# Patient Record
Sex: MALE | Race: WHITE | Employment: OTHER | URBAN - METROPOLITAN AREA
[De-identification: names, ages, dates, MRNs, and addresses within clinical notes are randomized per-mention and may not be internally consistent; named-entity substitution may affect disease eponyms.]

---

## 2017-09-25 NOTE — TELEPHONE ENCOUNTER
Requested Prescriptions     Pending Prescriptions Disp Refills    topiramate (TOPAMAX) 50 mg tablet 30 Tab 3     Sig: Take 1 Tab by mouth nightly.  traZODone (DESYREL) 100 mg tablet 30 Tab 3     Sig: Take 1 Tab by mouth nightly.  hydrOXYzine HCl (ATARAX) 50 mg tablet 90 Tab 3     Sig: Take 1 Tab by mouth three (3) times daily as needed for Itching for up to 10 days.        Last Refill: 12/14/2016  Next Appointment: 01/04/2018

## 2017-09-26 RX ORDER — TRAZODONE HYDROCHLORIDE 100 MG/1
100 TABLET ORAL
Qty: 30 TAB | Refills: 3 | Status: SHIPPED | OUTPATIENT
Start: 2017-09-26 | End: 2021-06-04

## 2017-09-26 RX ORDER — HYDROXYZINE 50 MG/1
50 TABLET, FILM COATED ORAL
Qty: 90 TAB | Refills: 3 | Status: SHIPPED | OUTPATIENT
Start: 2017-09-26 | End: 2017-10-06

## 2017-09-26 RX ORDER — TOPIRAMATE 50 MG/1
50 TABLET, FILM COATED ORAL
Qty: 30 TAB | Refills: 3 | Status: SHIPPED | OUTPATIENT
Start: 2017-09-26 | End: 2021-06-04

## 2017-09-27 ENCOUNTER — HOSPITAL ENCOUNTER (OUTPATIENT)
Dept: PREADMISSION TESTING | Age: 47
Discharge: HOME | End: 2017-09-27
Attending: ORTHOPAEDIC SURGERY
Payer: COMMERCIAL

## 2017-09-27 ENCOUNTER — HOSPITAL ENCOUNTER (OUTPATIENT)
Dept: GENERAL RADIOLOGY | Age: 47
Discharge: HOME | End: 2017-09-27
Attending: ORTHOPAEDIC SURGERY
Payer: COMMERCIAL

## 2017-09-27 VITALS
HEIGHT: 74 IN | OXYGEN SATURATION: 99 % | DIASTOLIC BLOOD PRESSURE: 70 MMHG | SYSTOLIC BLOOD PRESSURE: 122 MMHG | WEIGHT: 228.18 LBS | BODY MASS INDEX: 29.28 KG/M2 | HEART RATE: 67 BPM | RESPIRATION RATE: 18 BRPM | TEMPERATURE: 97.8 F

## 2017-09-27 LAB
25(OH)D3 SERPL-MCNC: 31.6 NG/ML (ref 30–100)
ABO + RH BLD: NORMAL
ALBUMIN SERPL-MCNC: 4.1 G/DL (ref 3.5–5)
ALBUMIN/GLOB SERPL: 1.3 {RATIO} (ref 1.1–2.2)
ALP SERPL-CCNC: 46 U/L (ref 45–117)
ALT SERPL-CCNC: 38 U/L (ref 12–78)
ANION GAP SERPL CALC-SCNC: 7 MMOL/L (ref 5–15)
APPEARANCE UR: CLEAR
AST SERPL-CCNC: 18 U/L (ref 15–37)
ATRIAL RATE: 59 BPM
BACTERIA URNS QL MICRO: NEGATIVE /HPF
BILIRUB SERPL-MCNC: 0.4 MG/DL (ref 0.2–1)
BILIRUB UR QL: NEGATIVE
BLOOD GROUP ANTIBODIES SERPL: NORMAL
BUN SERPL-MCNC: 23 MG/DL (ref 6–20)
BUN/CREAT SERPL: 19 (ref 12–20)
CALCIUM SERPL-MCNC: 8.5 MG/DL (ref 8.5–10.1)
CALCULATED P AXIS, ECG09: 51 DEGREES
CALCULATED R AXIS, ECG10: 34 DEGREES
CALCULATED T AXIS, ECG11: 16 DEGREES
CHLORIDE SERPL-SCNC: 111 MMOL/L (ref 97–108)
CO2 SERPL-SCNC: 25 MMOL/L (ref 21–32)
COLOR UR: ABNORMAL
CREAT SERPL-MCNC: 1.18 MG/DL (ref 0.7–1.3)
DIAGNOSIS, 93000: NORMAL
EPITH CASTS URNS QL MICRO: ABNORMAL /LPF
ERYTHROCYTE [DISTWIDTH] IN BLOOD BY AUTOMATED COUNT: 14.4 % (ref 11.5–14.5)
EST. AVERAGE GLUCOSE BLD GHB EST-MCNC: 126 MG/DL
GLOBULIN SER CALC-MCNC: 3.2 G/DL (ref 2–4)
GLUCOSE SERPL-MCNC: 105 MG/DL (ref 65–100)
GLUCOSE UR STRIP.AUTO-MCNC: NEGATIVE MG/DL
HBA1C MFR BLD: 6 % (ref 4.2–6.3)
HCT VFR BLD AUTO: 38.4 % (ref 36.6–50.3)
HGB BLD-MCNC: 12.8 G/DL (ref 12.1–17)
HGB UR QL STRIP: NEGATIVE
HYALINE CASTS URNS QL MICRO: ABNORMAL /LPF (ref 0–5)
INR PPP: 1.1 (ref 0.9–1.1)
KETONES UR QL STRIP.AUTO: NEGATIVE MG/DL
LEUKOCYTE ESTERASE UR QL STRIP.AUTO: NEGATIVE
MCH RBC QN AUTO: 26.6 PG (ref 26–34)
MCHC RBC AUTO-ENTMCNC: 33.3 G/DL (ref 30–36.5)
MCV RBC AUTO: 79.8 FL (ref 80–99)
MUCOUS THREADS URNS QL MICRO: ABNORMAL /LPF
NITRITE UR QL STRIP.AUTO: NEGATIVE
P-R INTERVAL, ECG05: 140 MS
PH UR STRIP: 5.5 [PH] (ref 5–8)
PLATELET # BLD AUTO: 249 K/UL (ref 150–400)
POTASSIUM SERPL-SCNC: 4.2 MMOL/L (ref 3.5–5.1)
PREALB SERPL-MCNC: 28.6 MG/DL (ref 20–40)
PROT SERPL-MCNC: 7.3 G/DL (ref 6.4–8.2)
PROT UR STRIP-MCNC: NEGATIVE MG/DL
PROTHROMBIN TIME: 10.6 SEC (ref 9–11.1)
Q-T INTERVAL, ECG07: 416 MS
QRS DURATION, ECG06: 90 MS
QTC CALCULATION (BEZET), ECG08: 411 MS
RBC # BLD AUTO: 4.81 M/UL (ref 4.1–5.7)
RBC #/AREA URNS HPF: ABNORMAL /HPF (ref 0–5)
SODIUM SERPL-SCNC: 143 MMOL/L (ref 136–145)
SP GR UR REFRACTOMETRY: 1.03 (ref 1–1.03)
SPECIMEN EXP DATE BLD: NORMAL
UA: UC IF INDICATED,UAUC: ABNORMAL
UROBILINOGEN UR QL STRIP.AUTO: 0.2 EU/DL (ref 0.2–1)
VENTRICULAR RATE, ECG03: 59 BPM
WBC # BLD AUTO: 5.8 K/UL (ref 4.1–11.1)
WBC URNS QL MICRO: ABNORMAL /HPF (ref 0–4)

## 2017-09-27 PROCEDURE — 84134 ASSAY OF PREALBUMIN: CPT | Performed by: ORTHOPAEDIC SURGERY

## 2017-09-27 PROCEDURE — 36415 COLL VENOUS BLD VENIPUNCTURE: CPT | Performed by: ORTHOPAEDIC SURGERY

## 2017-09-27 PROCEDURE — 85027 COMPLETE CBC AUTOMATED: CPT | Performed by: ORTHOPAEDIC SURGERY

## 2017-09-27 PROCEDURE — 93005 ELECTROCARDIOGRAM TRACING: CPT

## 2017-09-27 PROCEDURE — 71020 XR CHEST PA LAT: CPT

## 2017-09-27 PROCEDURE — 83036 HEMOGLOBIN GLYCOSYLATED A1C: CPT | Performed by: ORTHOPAEDIC SURGERY

## 2017-09-27 PROCEDURE — 85610 PROTHROMBIN TIME: CPT | Performed by: ORTHOPAEDIC SURGERY

## 2017-09-27 PROCEDURE — 86900 BLOOD TYPING SEROLOGIC ABO: CPT | Performed by: ORTHOPAEDIC SURGERY

## 2017-09-27 PROCEDURE — 81001 URINALYSIS AUTO W/SCOPE: CPT | Performed by: ORTHOPAEDIC SURGERY

## 2017-09-27 PROCEDURE — 80053 COMPREHEN METABOLIC PANEL: CPT | Performed by: ORTHOPAEDIC SURGERY

## 2017-09-27 PROCEDURE — 82306 VITAMIN D 25 HYDROXY: CPT | Performed by: ORTHOPAEDIC SURGERY

## 2017-09-27 RX ORDER — SODIUM CHLORIDE, SODIUM LACTATE, POTASSIUM CHLORIDE, CALCIUM CHLORIDE 600; 310; 30; 20 MG/100ML; MG/100ML; MG/100ML; MG/100ML
25 INJECTION, SOLUTION INTRAVENOUS CONTINUOUS
Status: CANCELLED | OUTPATIENT
Start: 2017-10-10

## 2017-09-27 RX ORDER — PREGABALIN 150 MG/1
150 CAPSULE ORAL ONCE
Status: CANCELLED | OUTPATIENT
Start: 2017-10-10 | End: 2017-10-10

## 2017-09-27 RX ORDER — ACETAMINOPHEN 500 MG
1000 TABLET ORAL ONCE
Status: CANCELLED | OUTPATIENT
Start: 2017-10-10 | End: 2017-10-10

## 2017-09-27 NOTE — PERIOP NOTES
[facility]  Preoperative Instructions        Surgery Date 10/10/2017          Time of Arrival 7:30 a.m.    1. On the day of your surgery, please report to the Surgical Services Registration Desk and sign in at your designated time. The Surgery Center is located to the right of the Emergency Room. 2. You must have someone with you to drive you home. You should not drive a car for 24 hours following surgery. Please make arrangements for a friend or family member to stay with you for the first 24 hours after your surgery. 3. Do not have anything to eat or drink (including water, gum, mints, coffee, juice) after midnight. ?This may not apply to medications prescribed by your physician. ?(Please note below the special instructions with medications to take the morning of your procedure.)    4. We recommend you do not drink any alcoholic beverages for 24 hours before and after your surgery. 5. Contact your surgeons office for instructions on the following medications: non-steroidal anti-inflammatory drugs (i.e. Advil, Aleve), vitamins, and supplements. (Some surgeons will want you to stop these medications prior to surgery and others may allow you to take them)  **If you are currently taking Plavix, Coumadin, Aspirin and/or other blood-thinning agents, contact your surgeon for instructions. ** Your surgeon will partner with the physician prescribing these medications to determine if it is safe to stop or if you need to continue taking. Please do not stop taking these medications without instructions from your surgeon    6. Wear comfortable clothes. Wear glasses instead of contacts. Do not bring any money or jewelry. Please bring picture ID, insurance card, and any prearranged co-payment or hospital payment. Do not wear make-up, particularly mascara the morning of your surgery. Do not wear nail polish, particularly if you are having foot /hand surgery.   Wear your hair loose or down, no ponytails, buns, radha pins or clips. All body piercings must be removed. Please shower with antibacterial soap for three consecutive days before and on the morning of surgery, but do not apply any lotions, powders or deodorants after the shower on the day of surgery. Please use a fresh towels after each shower. Please sleep in clean clothes and change bed linens the night before surgery. Please do not shave for 48 hours prior to surgery. Shaving of the face is acceptable. 7. You should understand that if you do not follow these instructions your surgery may be cancelled. If your physical condition changes (I.e. fever, cold or flu) please contact your surgeon as soon as possible. 8. It is important that you be on time. If a situation occurs where you may be late, please call [phone] (OR Holding Area). 9. If you have any questions and or problems, please call [phone] (Pre-admission Testing). 10. Your surgery time may be subject to change. You will receive a phone call the evening prior if your time changes. 11.  If having outpatient surgery, you must have someone to drive you here, stay with you during the duration of your stay, and to drive you home at time of discharge. 12.   In an effort to improve the efficiency, privacy, and safety for all of our Pre-op patients visitors are not allowed in the Holding area. Once you arrive and are registered your family/visitors will be asked to remain in the waiting room. The Pre-op staff will get you from the Surgical Waiting Area and will explain to you and your family/visitors that the Pre-op phase is beginning. The staff will answer any questions and provide instructions for tracking of the patient, by use of the existing tracking number and color-coded status board in the waiting room.   At this time the staff will also ask for your designated spokesperson information in the event that the physician or staff need to provide an update or obtain any pertinent information. The designated spokesperson will be notified if the physician needs to speak to family during the pre-operative phase. If at any time your family/visitors has questions or concerns they may approach the volunteer desk in the waiting area for assistance. Special Instructions:    MEDICATIONS TO TAKE THE MORNING OF SURGERY WITH A SIP OF WATER: hydroxyzine if needed      I understand a pre-operative phone call will be made to verify my surgery time. In the event that I am not available, I give permission for a message to be left on my answering service and/or with another person?   Yes [phone]           ___________________      __________   _________    (Signature of Patient)             (Witness)                (Date and Time)

## 2017-09-28 LAB
BACTERIA SPEC CULT: NORMAL
BACTERIA SPEC CULT: NORMAL
SERVICE CMNT-IMP: NORMAL

## 2017-10-02 ENCOUNTER — OFFICE VISIT (OUTPATIENT)
Dept: INTERNAL MEDICINE CLINIC | Age: 47
End: 2017-10-02

## 2017-10-02 VITALS
HEIGHT: 74 IN | BODY MASS INDEX: 29 KG/M2 | DIASTOLIC BLOOD PRESSURE: 85 MMHG | WEIGHT: 226 LBS | SYSTOLIC BLOOD PRESSURE: 131 MMHG | HEART RATE: 67 BPM

## 2017-10-02 DIAGNOSIS — Z01.818 PREOPERATIVE EXAMINATION: Primary | ICD-10-CM

## 2017-10-02 PROBLEM — Z00.00 ANNUAL PHYSICAL EXAM: Status: ACTIVE | Noted: 2017-10-02

## 2017-10-02 PROBLEM — F10.10 ALCOHOL ABUSE: Status: ACTIVE | Noted: 2017-10-02

## 2017-10-02 PROBLEM — C44.91 BASAL CELL CARCINOMA: Status: ACTIVE | Noted: 2017-10-02

## 2017-10-02 PROBLEM — M54.12 RIGHT CERVICAL RADICULOPATHY: Status: ACTIVE | Noted: 2017-10-02

## 2017-10-02 PROBLEM — S83.209A TORN MENISCUS: Status: ACTIVE | Noted: 2017-10-02

## 2017-10-02 PROBLEM — G47.9 SLEEP DISORDER: Status: ACTIVE | Noted: 2017-10-02

## 2017-10-02 PROBLEM — M54.9 BACK PAIN: Status: ACTIVE | Noted: 2017-10-02

## 2017-10-02 PROBLEM — M25.511 RIGHT ANTERIOR SHOULDER PAIN: Status: ACTIVE | Noted: 2017-10-02

## 2017-10-02 PROBLEM — I86.1 VARICOCELE: Status: ACTIVE | Noted: 2017-10-02

## 2017-10-02 PROBLEM — F41.1 GAD (GENERALIZED ANXIETY DISORDER): Status: ACTIVE | Noted: 2017-10-02

## 2017-10-02 PROBLEM — F32.A DEPRESSION, ACUTE: Status: ACTIVE | Noted: 2017-10-02

## 2017-10-02 RX ORDER — MELOXICAM 15 MG/1
15 TABLET ORAL DAILY
COMMUNITY
End: 2017-10-02

## 2017-10-02 RX ORDER — HYDROCODONE BITARTRATE AND ACETAMINOPHEN 5; 300 MG/1; MG/1
TABLET ORAL
COMMUNITY
End: 2017-10-02

## 2017-10-02 NOTE — PROGRESS NOTES
Samreen Vidal II is a 56 y.o. male presenting for Pre-op Exam ( 2 -  Dr Sumeet Wayne - 10-10-1-17 - neck)  . 1. Have you been to the ER, urgent care clinic since your last visit? Hospitalized since your last visit? No    2. Have you seen or consulted any other health care providers outside of the [address] [place] [address] since your last visit? Include any pap smears or colon screening.  No

## 2017-10-02 NOTE — MR AVS SNAPSHOT
Visit Information Date & Time Provider Department Dept. Phone Encounter #  
 10/2/2017  3:30 PM MD Mariaa Beth 84 [phone] [id number] Follow-up Instructions Return if symptoms worsen or fail to improve. Routing History Follow-up and Disposition History Your Appointments 1/4/2018  1:00 PM  
Follow Up with MD Mariaa Elena 84 ([facility]) Appt Note: 6 mo  
 [address] [address] [address] [facility] [address] Upcoming Health Maintenance Date Due Pneumococcal 19-64 Medium Risk (1 of 1 - PPSV23) 6/16/1990 DTaP/Tdap/Td series (1 - Tdap) 6/16/1992 INFLUENZA AGE 9 TO ADULT 8/1/2017 Allergies as of 10/2/2017  Review Complete On: 10/2/2017 By: Td Reich MD  
 No Known Allergies Current Immunizations  Never Reviewed No immunizations on file. Not reviewed this visit You Were Diagnosed With   
  
 Codes Comments Preoperative examination    -  Primary ICD-10-CM: E79.222 ICD-9-CM: V72.84 Vitals BP Pulse Height(growth percentile) Weight(growth percentile) BMI Smoking Status 131/85 (BP 1 Location: Left arm, BP Patient Position: Sitting) 67 6' 2\" (1.88 m) 226 lb (102.5 kg) 29.02 kg/m2 Never Smoker Vitals History BMI and BSA Data Body Mass Index Body Surface Area  
 29.02 kg/m 2 2.31 m 2 Preferred Pharmacy Pharmacy Name Phone [facility] [address] [phone] Your Updated Medication List  
  
   
This list is accurate as of: 10/2/17  4:47 PM.  Always use your most recent med list.  
  
  
  
  
 hydrOXYzine HCl 50 mg tablet Commonly known as:  ATARAX Take 1 Tab by mouth three (3) times daily as needed for Itching for up to 10 days. topiramate 50 mg tablet Commonly known as:  TOPAMAX Take 1 Tab by mouth nightly. traZODone 100 mg tablet Commonly known as:  Shyam Preston Take 1 Tab by mouth nightly. Follow-up Instructions Return if symptoms worsen or fail to improve. Introducing [facility] SERVICES! Jez Raf[employer] introduces [employer] patient portal. Now you can access parts of your medical record, email your doctor's office, and request medication refills online. 1. In your internet browser, go to [email or web address] 2. Click on the First Time User? Click Here link in the Sign In box. You will see the New Member Sign Up page. 3. Enter your [employer] Access Code exactly as it appears below. You will not need to use this code after youve completed the sign-up process. If you do not sign up before the expiration date, you must request a new code. · [employer] Access Code: [email or web address] Expires: 12/31/2017  3:26 PM 
 
4. Enter the last four digits of your Social Security Number (xxxx) and Date of Birth (mm/dd/yyyy) as indicated and click Submit. You will be taken to the next sign-up page. 5. Create a [employer] ID. This will be your [employer] login ID and cannot be changed, so think of one that is secure and easy to remember. 6. Create a [employer] password. You can change your password at any time. 7. Enter your Password Reset Question and Answer. This can be used at a later time if you forget your password. 8. Enter your e-mail address. You will receive e-mail notification when new information is available in [address] 9. Click Sign Up. You can now view and download portions of your medical record. 10. Click the Download Summary menu link to download a portable copy of your medical information. If you have questions, please visit the Frequently Asked Questions section of the [employer] website. Remember, [employer] is NOT to be used for urgent needs. For medical emergencies, dial 911. Now available from your iPhone and Android! Please provide this summary of care documentation to your next provider. Your primary care clinician is listed as JUS Yu. If you have any questions after today's visit, please call [phone].

## 2017-10-02 NOTE — PROGRESS NOTES
Dyan Zuniga is a 56 y.o. male is a 56 y.o. yo male who presents for preoperative evaluation. He is having a C5-6 revision, C6-7 ACDF on October 10, 2017 with Dr. August Oleary. Latex Allergy:NO    History of anesthesia reaction: No    History of PE/DVT:No    No Known Allergies    Current Outpatient Prescriptions   Medication Sig    topiramate (TOPAMAX) 50 mg tablet Take 1 Tab by mouth nightly.  traZODone (DESYREL) 100 mg tablet Take 1 Tab by mouth nightly.  hydrOXYzine HCl (ATARAX) 50 mg tablet Take 1 Tab by mouth three (3) times daily as needed for Itching for up to 10 days. (Patient taking differently: Take 50 mg by mouth as needed for Itching.)     No current facility-administered medications for this visit.         Patient Active Problem List   Diagnosis Code    Right groin pain R10.31    Alcohol dependence ([address] F10.20    Torn meniscus S83.209A    Right anterior shoulder pain M25.511    Basal cell carcinoma C44.91    Depression, acute F32.9    Alcohol abuse F10.10    IASBEL (generalized anxiety disorder) F41.1    Sleep disorder G47.9    Annual physical exam Z00.00    Varicocele I86.1    Back pain M54.9    Right cervical radiculopathy M54.12       Past Medical History:   Diagnosis Date    Alcohol abuse 10/2/2017    Annual physical exam 10/2/2017    Anxiety     Back pain 10/2/2017    Basal cell carcinoma 10/2/2017    Depression, acute 10/2/2017    ISABEL (generalized anxiety disorder) 10/2/2017    Mood disorder ([facility] [address]     on topiramate, hx of alcoholism    Right anterior shoulder pain 10/2/2017    Right cervical radiculopathy 10/2/2017    Sleep disorder 10/2/2017    Torn meniscus 10/2/2017    Varicocele 10/2/2017        Past Surgical History:   Procedure Laterality Date    HX HERNIA REPAIR  2006   Neon Living    Dr. Opal Pérez [facility] ORTHOPAEDIC  2004    Disc Fusion C5-C6 (Dr. Wang Durand, [facility])       Family History   Problem Relation Age of Onset    Heart Disease Father     Hypertension Father     Diabetes Maternal Uncle     Cancer Maternal Uncle     Heart Disease Maternal Grandmother     Hypertension Maternal Grandmother     Heart Disease Maternal Grandfather     Hypertension Maternal Grandfather     Heart Disease Paternal Grandmother     Hypertension Paternal Grandmother     Cancer Paternal Grandmother      lung    Heart Disease Paternal Grandfather     Hypertension Paternal Grandfather        Social History     Social History    Marital status: SINGLE     Spouse name: N/A    Number of children: N/A    Years of education: N/A     Occupational History    Not on file. Social History Main Topics    Smoking status: Never Smoker    Smokeless tobacco: Current User    Alcohol use Yes      Comment: rarely per pt (hx of ETOH abuse)    Drug use: No    Sexual activity: Not on file     Other Topics Concern    Not on file     Social History Narrative       Reviewed PmHx, RxHx, FmHx, SocHx, AllgHx and updated and dated in the chart. Review of Systems  Constitutional: negative for fevers, chills, anorexia and weight loss  Eyes:   negative for visual disturbance and irritation  ENT:   negative for tinnitus,sore throat,nasal congestion,ear pains. hoarseness  Respiratory:  negative for cough, hemoptysis, dyspnea,wheezing  CV:   negative for chest pain, palpitations, lower extremity edema  GI:   negative for nausea, vomiting, diarrhea, abdominal pain,melena  Endo:               negative for polyuria,polydipsia,polyphagia,heat intolerance  Genitourinary: negative for frequency, dysuria and hematuria  Integumentary: negative for rash and pruritus  Hematologic:  negative for easy bruising and gum/nose bleeding  Musculoskel: negative for myalgias, arthralgias, back pain, muscle weakness, joint pain  Neurological:  negative for headaches, dizziness, vertigo, memory problems and gait   Behavl/Psych: negative for feelings of anxiety, depression, mood changes      Objective:     Vitals:    10/02/17 1532   BP: 131/85   Pulse: 67   Weight: 226 lb (102.5 kg)   Height: 6' 2\" (1.88 m)     Physical Examination: General appearance - alert, well appearing, and in no distress, oriented to person, place, and time and normal appearing weight  Mental status - alert, oriented to person, place, and time, normal mood, behavior, speech, dress, motor activity, and thought processes  Eyes - pupils equal and reactive, extraocular eye movements intact  Ears - bilateral TM's and external ear canals normal  Nose - normal and patent, no erythema, discharge or polyps  Neck - supple, no significant adenopathy  Lymphatics - no palpable lymphadenopathy, no hepatosplenomegaly  Chest - clear to auscultation, no wheezes, rales or rhonchi, symmetric air entry  Heart - normal rate, regular rhythm, normal S1, S2, no murmurs, rubs, clicks or gallops  Abdomen - soft, nontender, nondistended, no masses or organomegaly    Assessment/ Plan:   Diagnoses and all orders for this visit:    1. Preoperative examination    Mr. Artist Kehr is medically stable for planned procedure. Preoperative labs reviewed from [facility] and are stable. Proceed with planned procedure without further risk stratification. Follow-up Disposition: Not on File    I have discussed the diagnosis with the patient and the intended plan as seen in the above orders. The patient has received an after-visit summary and questions were answered concerning future plans. Pt conveyed understanding of plan.         Gissell Atkinson MD

## 2017-10-10 ENCOUNTER — ANESTHESIA EVENT (OUTPATIENT)
Dept: SURGERY | Age: 47
End: 2017-10-10
Payer: COMMERCIAL

## 2017-10-10 ENCOUNTER — ANESTHESIA (OUTPATIENT)
Dept: SURGERY | Age: 47
End: 2017-10-10
Payer: COMMERCIAL

## 2017-10-10 ENCOUNTER — APPOINTMENT (OUTPATIENT)
Dept: GENERAL RADIOLOGY | Age: 47
End: 2017-10-10
Attending: ORTHOPAEDIC SURGERY
Payer: COMMERCIAL

## 2017-10-10 ENCOUNTER — HOSPITAL ENCOUNTER (OUTPATIENT)
Age: 47
Setting detail: OBSERVATION
Discharge: HOME | End: 2017-10-11
Attending: ORTHOPAEDIC SURGERY | Admitting: ORTHOPAEDIC SURGERY
Payer: COMMERCIAL

## 2017-10-10 PROBLEM — M50.20 HNP (HERNIATED NUCLEUS PULPOSUS), CERVICAL: Status: ACTIVE | Noted: 2017-10-10

## 2017-10-10 PROCEDURE — 74011250636 HC RX REV CODE- 250/636

## 2017-10-10 PROCEDURE — 77030018836 HC SOL IRR NACL ICUM -A: Performed by: ORTHOPAEDIC SURGERY

## 2017-10-10 PROCEDURE — 77030011267 HC ELECTRD BLD COVD -A: Performed by: ORTHOPAEDIC SURGERY

## 2017-10-10 PROCEDURE — 77030034479 HC ADH SKN CLSR PRINEO J&J -B: Performed by: ORTHOPAEDIC SURGERY

## 2017-10-10 PROCEDURE — 51798 US URINE CAPACITY MEASURE: CPT

## 2017-10-10 PROCEDURE — C1713 ANCHOR/SCREW BN/BN,TIS/BN: HCPCS | Performed by: ORTHOPAEDIC SURGERY

## 2017-10-10 PROCEDURE — 74011250636 HC RX REV CODE- 250/636: Performed by: ANESTHESIOLOGY

## 2017-10-10 PROCEDURE — 77030011943

## 2017-10-10 PROCEDURE — 74011250636 HC RX REV CODE- 250/636: Performed by: ORTHOPAEDIC SURGERY

## 2017-10-10 PROCEDURE — 76010000172 HC OR TIME 2.5 TO 3 HR INTENSV-TIER 1: Performed by: ORTHOPAEDIC SURGERY

## 2017-10-10 PROCEDURE — 77030021678 HC GLIDESCP STAT DISP VERT -B: Performed by: NURSE ANESTHETIST, CERTIFIED REGISTERED

## 2017-10-10 PROCEDURE — 77030014647 HC SEAL FBRN TISSL BAXT -D: Performed by: ORTHOPAEDIC SURGERY

## 2017-10-10 PROCEDURE — 77030032490 HC SLV COMPR SCD KNE COVD -B: Performed by: ORTHOPAEDIC SURGERY

## 2017-10-10 PROCEDURE — 74011000250 HC RX REV CODE- 250: Performed by: ORTHOPAEDIC SURGERY

## 2017-10-10 PROCEDURE — 77030034475 HC MISC IMPL SPN: Performed by: ORTHOPAEDIC SURGERY

## 2017-10-10 PROCEDURE — 77030008684 HC TU ET CUF COVD -B: Performed by: NURSE ANESTHETIST, CERTIFIED REGISTERED

## 2017-10-10 PROCEDURE — 72040 X-RAY EXAM NECK SPINE 2-3 VW: CPT

## 2017-10-10 PROCEDURE — 76210000017 HC OR PH I REC 1.5 TO 2 HR: Performed by: ORTHOPAEDIC SURGERY

## 2017-10-10 PROCEDURE — 76060000036 HC ANESTHESIA 2.5 TO 3 HR: Performed by: ORTHOPAEDIC SURGERY

## 2017-10-10 PROCEDURE — 77030019908 HC STETH ESOPH SIMS -A: Performed by: NURSE ANESTHETIST, CERTIFIED REGISTERED

## 2017-10-10 PROCEDURE — 77030002996 HC SUT SLK J&J -A: Performed by: ORTHOPAEDIC SURGERY

## 2017-10-10 PROCEDURE — 77030013567 HC DRN WND RESERV BARD -A: Performed by: ORTHOPAEDIC SURGERY

## 2017-10-10 PROCEDURE — 77030020061 HC IV BLD WRMR ADMIN SET 3M -B: Performed by: NURSE ANESTHETIST, CERTIFIED REGISTERED

## 2017-10-10 PROCEDURE — 77030018846 HC SOL IRR STRL H20 ICUM -A: Performed by: ORTHOPAEDIC SURGERY

## 2017-10-10 PROCEDURE — 76001 XR FLUOROSCOPY OVER 60 MINUTES: CPT

## 2017-10-10 PROCEDURE — 77010033678 HC OXYGEN DAILY

## 2017-10-10 PROCEDURE — 77030009868 HC PIN DISTR CASPR AESC -B: Performed by: ORTHOPAEDIC SURGERY

## 2017-10-10 PROCEDURE — 77030013079 HC BLNKT BAIR HGGR 3M -A: Performed by: NURSE ANESTHETIST, CERTIFIED REGISTERED

## 2017-10-10 PROCEDURE — 74011000272 HC RX REV CODE- 272: Performed by: ORTHOPAEDIC SURGERY

## 2017-10-10 PROCEDURE — 77030034849: Performed by: ORTHOPAEDIC SURGERY

## 2017-10-10 PROCEDURE — 74011000250 HC RX REV CODE- 250

## 2017-10-10 PROCEDURE — 77030012414: Performed by: ORTHOPAEDIC SURGERY

## 2017-10-10 PROCEDURE — 77030018719 HC DRSG PTCH ANTIMIC J&J -A: Performed by: ORTHOPAEDIC SURGERY

## 2017-10-10 PROCEDURE — 77030029099 HC BN WAX SSPC -A: Performed by: ORTHOPAEDIC SURGERY

## 2017-10-10 PROCEDURE — 77030003029 HC SUT VCRL J&J -B: Performed by: ORTHOPAEDIC SURGERY

## 2017-10-10 PROCEDURE — 77030033138 HC SUT PGA STRATFX J&J -B: Performed by: ORTHOPAEDIC SURGERY

## 2017-10-10 PROCEDURE — 77030004391 HC BUR FLUT MEDT -C: Performed by: ORTHOPAEDIC SURGERY

## 2017-10-10 PROCEDURE — 77030003028 HC SUT VCRL J&J -A: Performed by: ORTHOPAEDIC SURGERY

## 2017-10-10 PROCEDURE — 99218 HC RM OBSERVATION: CPT

## 2017-10-10 PROCEDURE — 77030002986 HC SUT PROL J&J -A: Performed by: ORTHOPAEDIC SURGERY

## 2017-10-10 PROCEDURE — 77030012961 HC IRR KT CYSTO/TUR ICUM -A: Performed by: ORTHOPAEDIC SURGERY

## 2017-10-10 PROCEDURE — 77030030028 HC BIT DRL SPN FLAT CHK DSP J&J -B: Performed by: ORTHOPAEDIC SURGERY

## 2017-10-10 PROCEDURE — 74011250637 HC RX REV CODE- 250/637: Performed by: ORTHOPAEDIC SURGERY

## 2017-10-10 PROCEDURE — 74011000258 HC RX REV CODE- 258: Performed by: ORTHOPAEDIC SURGERY

## 2017-10-10 PROCEDURE — 77030008467 HC STPLR SKN COVD -B: Performed by: ORTHOPAEDIC SURGERY

## 2017-10-10 DEVICE — IMPLANTABLE DEVICE: Type: IMPLANTABLE DEVICE | Site: SPINE CERVICAL | Status: FUNCTIONAL

## 2017-10-10 DEVICE — GRAFT BNE SUB SM CANC FRZN MORSELIZED W/ VIABLE CELL: Type: IMPLANTABLE DEVICE | Site: SPINE CERVICAL | Status: FUNCTIONAL

## 2017-10-10 DEVICE — SCREW SPNL L14MM DIA4MM ANT CERV TI ST CONSTRN SKYLINE: Type: IMPLANTABLE DEVICE | Site: SPINE CERVICAL | Status: FUNCTIONAL

## 2017-10-10 RX ORDER — OXYCODONE HYDROCHLORIDE 5 MG/1
10 TABLET ORAL
Status: DISCONTINUED | OUTPATIENT
Start: 2017-10-10 | End: 2017-10-11 | Stop reason: HOSPADM

## 2017-10-10 RX ORDER — MIDAZOLAM HYDROCHLORIDE 1 MG/ML
1 INJECTION, SOLUTION INTRAMUSCULAR; INTRAVENOUS AS NEEDED
Status: DISCONTINUED | OUTPATIENT
Start: 2017-10-10 | End: 2017-10-10 | Stop reason: HOSPADM

## 2017-10-10 RX ORDER — LIDOCAINE HYDROCHLORIDE 20 MG/ML
INJECTION, SOLUTION EPIDURAL; INFILTRATION; INTRACAUDAL; PERINEURAL AS NEEDED
Status: DISCONTINUED | OUTPATIENT
Start: 2017-10-10 | End: 2017-10-10 | Stop reason: HOSPADM

## 2017-10-10 RX ORDER — SODIUM CHLORIDE 0.9 % (FLUSH) 0.9 %
5-10 SYRINGE (ML) INJECTION EVERY 8 HOURS
Status: DISCONTINUED | OUTPATIENT
Start: 2017-10-11 | End: 2017-10-11 | Stop reason: HOSPADM

## 2017-10-10 RX ORDER — DEXMEDETOMIDINE HYDROCHLORIDE 4 UG/ML
INJECTION, SOLUTION INTRAVENOUS AS NEEDED
Status: DISCONTINUED | OUTPATIENT
Start: 2017-10-10 | End: 2017-10-10 | Stop reason: HOSPADM

## 2017-10-10 RX ORDER — SODIUM CHLORIDE, SODIUM LACTATE, POTASSIUM CHLORIDE, CALCIUM CHLORIDE 600; 310; 30; 20 MG/100ML; MG/100ML; MG/100ML; MG/100ML
100 INJECTION, SOLUTION INTRAVENOUS CONTINUOUS
Status: DISCONTINUED | OUTPATIENT
Start: 2017-10-10 | End: 2017-10-10 | Stop reason: HOSPADM

## 2017-10-10 RX ORDER — HYDROMORPHONE HYDROCHLORIDE 1 MG/ML
1 INJECTION, SOLUTION INTRAMUSCULAR; INTRAVENOUS; SUBCUTANEOUS
Status: DISCONTINUED | OUTPATIENT
Start: 2017-10-10 | End: 2017-10-11 | Stop reason: HOSPADM

## 2017-10-10 RX ORDER — ONDANSETRON 2 MG/ML
INJECTION INTRAMUSCULAR; INTRAVENOUS AS NEEDED
Status: DISCONTINUED | OUTPATIENT
Start: 2017-10-10 | End: 2017-10-10 | Stop reason: HOSPADM

## 2017-10-10 RX ORDER — SODIUM CHLORIDE 0.9 % (FLUSH) 0.9 %
5-10 SYRINGE (ML) INJECTION AS NEEDED
Status: DISCONTINUED | OUTPATIENT
Start: 2017-10-10 | End: 2017-10-11 | Stop reason: HOSPADM

## 2017-10-10 RX ORDER — DEXAMETHASONE SODIUM PHOSPHATE 4 MG/ML
10 INJECTION, SOLUTION INTRA-ARTICULAR; INTRALESIONAL; INTRAMUSCULAR; INTRAVENOUS; SOFT TISSUE ONCE
Status: COMPLETED | OUTPATIENT
Start: 2017-10-11 | End: 2017-10-11

## 2017-10-10 RX ORDER — SUCCINYLCHOLINE CHLORIDE 20 MG/ML
INJECTION INTRAMUSCULAR; INTRAVENOUS AS NEEDED
Status: DISCONTINUED | OUTPATIENT
Start: 2017-10-10 | End: 2017-10-10 | Stop reason: HOSPADM

## 2017-10-10 RX ORDER — SODIUM CHLORIDE 0.9 % (FLUSH) 0.9 %
5-10 SYRINGE (ML) INJECTION AS NEEDED
Status: DISCONTINUED | OUTPATIENT
Start: 2017-10-10 | End: 2017-10-10 | Stop reason: HOSPADM

## 2017-10-10 RX ORDER — HYDROXYZINE PAMOATE 25 MG/1
25 CAPSULE ORAL
Status: DISCONTINUED | OUTPATIENT
Start: 2017-10-10 | End: 2017-10-11 | Stop reason: HOSPADM

## 2017-10-10 RX ORDER — GLYCOPYRROLATE 0.2 MG/ML
INJECTION INTRAMUSCULAR; INTRAVENOUS AS NEEDED
Status: DISCONTINUED | OUTPATIENT
Start: 2017-10-10 | End: 2017-10-10 | Stop reason: HOSPADM

## 2017-10-10 RX ORDER — SODIUM CHLORIDE 0.9 % (FLUSH) 0.9 %
5-10 SYRINGE (ML) INJECTION EVERY 8 HOURS
Status: DISCONTINUED | OUTPATIENT
Start: 2017-10-10 | End: 2017-10-10 | Stop reason: HOSPADM

## 2017-10-10 RX ORDER — ROCURONIUM BROMIDE 10 MG/ML
INJECTION, SOLUTION INTRAVENOUS AS NEEDED
Status: DISCONTINUED | OUTPATIENT
Start: 2017-10-10 | End: 2017-10-10 | Stop reason: HOSPADM

## 2017-10-10 RX ORDER — GABAPENTIN 100 MG/1
100 CAPSULE ORAL 3 TIMES DAILY
Status: DISCONTINUED | OUTPATIENT
Start: 2017-10-10 | End: 2017-10-11 | Stop reason: HOSPADM

## 2017-10-10 RX ORDER — POLYETHYLENE GLYCOL 3350 17 G/17G
17 POWDER, FOR SOLUTION ORAL DAILY
Status: DISCONTINUED | OUTPATIENT
Start: 2017-10-11 | End: 2017-10-11 | Stop reason: HOSPADM

## 2017-10-10 RX ORDER — TRAMADOL HYDROCHLORIDE 50 MG/1
50-100 TABLET ORAL
Status: DISCONTINUED | OUTPATIENT
Start: 2017-10-10 | End: 2017-10-11 | Stop reason: HOSPADM

## 2017-10-10 RX ORDER — OXYCODONE HYDROCHLORIDE 5 MG/1
5 TABLET ORAL
Status: DISCONTINUED | OUTPATIENT
Start: 2017-10-10 | End: 2017-10-11 | Stop reason: HOSPADM

## 2017-10-10 RX ORDER — ACETAMINOPHEN 500 MG
1000 TABLET ORAL ONCE
Status: COMPLETED | OUTPATIENT
Start: 2017-10-10 | End: 2017-10-10

## 2017-10-10 RX ORDER — ROPIVACAINE HYDROCHLORIDE 5 MG/ML
30 INJECTION, SOLUTION EPIDURAL; INFILTRATION; PERINEURAL AS NEEDED
Status: DISCONTINUED | OUTPATIENT
Start: 2017-10-10 | End: 2017-10-10 | Stop reason: HOSPADM

## 2017-10-10 RX ORDER — SODIUM CHLORIDE 9 MG/ML
125 INJECTION, SOLUTION INTRAVENOUS CONTINUOUS
Status: DISCONTINUED | OUTPATIENT
Start: 2017-10-10 | End: 2017-10-11 | Stop reason: HOSPADM

## 2017-10-10 RX ORDER — DIPHENHYDRAMINE HYDROCHLORIDE 50 MG/ML
12.5 INJECTION, SOLUTION INTRAMUSCULAR; INTRAVENOUS AS NEEDED
Status: DISCONTINUED | OUTPATIENT
Start: 2017-10-10 | End: 2017-10-10 | Stop reason: HOSPADM

## 2017-10-10 RX ORDER — FENTANYL CITRATE 50 UG/ML
25 INJECTION, SOLUTION INTRAMUSCULAR; INTRAVENOUS
Status: COMPLETED | OUTPATIENT
Start: 2017-10-10 | End: 2017-10-10

## 2017-10-10 RX ORDER — SODIUM CHLORIDE 9 MG/ML
25 INJECTION, SOLUTION INTRAVENOUS CONTINUOUS
Status: DISCONTINUED | OUTPATIENT
Start: 2017-10-10 | End: 2017-10-10 | Stop reason: HOSPADM

## 2017-10-10 RX ORDER — AMOXICILLIN 250 MG
1 CAPSULE ORAL 2 TIMES DAILY
Status: DISCONTINUED | OUTPATIENT
Start: 2017-10-10 | End: 2017-10-11 | Stop reason: HOSPADM

## 2017-10-10 RX ORDER — HYDROMORPHONE HYDROCHLORIDE 1 MG/ML
0.5 INJECTION, SOLUTION INTRAMUSCULAR; INTRAVENOUS; SUBCUTANEOUS
Status: DISCONTINUED | OUTPATIENT
Start: 2017-10-10 | End: 2017-10-10 | Stop reason: HOSPADM

## 2017-10-10 RX ORDER — PREGABALIN 75 MG/1
150 CAPSULE ORAL ONCE
Status: COMPLETED | OUTPATIENT
Start: 2017-10-10 | End: 2017-10-10

## 2017-10-10 RX ORDER — NEOSTIGMINE METHYLSULFATE 1 MG/ML
INJECTION INTRAVENOUS AS NEEDED
Status: DISCONTINUED | OUTPATIENT
Start: 2017-10-10 | End: 2017-10-10 | Stop reason: HOSPADM

## 2017-10-10 RX ORDER — MIDAZOLAM HYDROCHLORIDE 1 MG/ML
0.5 INJECTION, SOLUTION INTRAMUSCULAR; INTRAVENOUS
Status: DISCONTINUED | OUTPATIENT
Start: 2017-10-10 | End: 2017-10-10 | Stop reason: HOSPADM

## 2017-10-10 RX ORDER — MIDAZOLAM HYDROCHLORIDE 1 MG/ML
INJECTION, SOLUTION INTRAMUSCULAR; INTRAVENOUS AS NEEDED
Status: DISCONTINUED | OUTPATIENT
Start: 2017-10-10 | End: 2017-10-10 | Stop reason: HOSPADM

## 2017-10-10 RX ORDER — TRAZODONE HYDROCHLORIDE 100 MG/1
100 TABLET ORAL
Status: DISCONTINUED | OUTPATIENT
Start: 2017-10-10 | End: 2017-10-11 | Stop reason: HOSPADM

## 2017-10-10 RX ORDER — FENTANYL CITRATE 50 UG/ML
50 INJECTION, SOLUTION INTRAMUSCULAR; INTRAVENOUS AS NEEDED
Status: DISCONTINUED | OUTPATIENT
Start: 2017-10-10 | End: 2017-10-10 | Stop reason: HOSPADM

## 2017-10-10 RX ORDER — LIDOCAINE HYDROCHLORIDE 10 MG/ML
0.1 INJECTION, SOLUTION EPIDURAL; INFILTRATION; INTRACAUDAL; PERINEURAL AS NEEDED
Status: DISCONTINUED | OUTPATIENT
Start: 2017-10-10 | End: 2017-10-10 | Stop reason: HOSPADM

## 2017-10-10 RX ORDER — PROPOFOL 10 MG/ML
INJECTION, EMULSION INTRAVENOUS AS NEEDED
Status: DISCONTINUED | OUTPATIENT
Start: 2017-10-10 | End: 2017-10-10 | Stop reason: HOSPADM

## 2017-10-10 RX ORDER — FAMOTIDINE 20 MG/1
20 TABLET, FILM COATED ORAL 2 TIMES DAILY
Status: DISCONTINUED | OUTPATIENT
Start: 2017-10-10 | End: 2017-10-11 | Stop reason: HOSPADM

## 2017-10-10 RX ORDER — DIAZEPAM 5 MG/1
5 TABLET ORAL
Status: DISCONTINUED | OUTPATIENT
Start: 2017-10-10 | End: 2017-10-11 | Stop reason: HOSPADM

## 2017-10-10 RX ORDER — ONDANSETRON 2 MG/ML
4 INJECTION INTRAMUSCULAR; INTRAVENOUS
Status: DISCONTINUED | OUTPATIENT
Start: 2017-10-10 | End: 2017-10-11 | Stop reason: HOSPADM

## 2017-10-10 RX ORDER — ACETAMINOPHEN 500 MG
1000 TABLET ORAL EVERY 6 HOURS
Status: DISCONTINUED | OUTPATIENT
Start: 2017-10-10 | End: 2017-10-11 | Stop reason: HOSPADM

## 2017-10-10 RX ORDER — FENTANYL CITRATE 50 UG/ML
INJECTION, SOLUTION INTRAMUSCULAR; INTRAVENOUS AS NEEDED
Status: DISCONTINUED | OUTPATIENT
Start: 2017-10-10 | End: 2017-10-10 | Stop reason: HOSPADM

## 2017-10-10 RX ORDER — MORPHINE SULFATE 10 MG/ML
2 INJECTION, SOLUTION INTRAMUSCULAR; INTRAVENOUS
Status: DISCONTINUED | OUTPATIENT
Start: 2017-10-10 | End: 2017-10-10 | Stop reason: HOSPADM

## 2017-10-10 RX ORDER — SODIUM CHLORIDE, SODIUM LACTATE, POTASSIUM CHLORIDE, CALCIUM CHLORIDE 600; 310; 30; 20 MG/100ML; MG/100ML; MG/100ML; MG/100ML
25 INJECTION, SOLUTION INTRAVENOUS CONTINUOUS
Status: DISCONTINUED | OUTPATIENT
Start: 2017-10-10 | End: 2017-10-10 | Stop reason: HOSPADM

## 2017-10-10 RX ORDER — KETAMINE HYDROCHLORIDE 100 MG/ML
INJECTION, SOLUTION INTRAMUSCULAR; INTRAVENOUS AS NEEDED
Status: DISCONTINUED | OUTPATIENT
Start: 2017-10-10 | End: 2017-10-10 | Stop reason: HOSPADM

## 2017-10-10 RX ORDER — DEXAMETHASONE SODIUM PHOSPHATE 4 MG/ML
INJECTION, SOLUTION INTRA-ARTICULAR; INTRALESIONAL; INTRAMUSCULAR; INTRAVENOUS; SOFT TISSUE AS NEEDED
Status: DISCONTINUED | OUTPATIENT
Start: 2017-10-10 | End: 2017-10-10 | Stop reason: HOSPADM

## 2017-10-10 RX ORDER — HYDROXYZINE HYDROCHLORIDE 10 MG/1
10 TABLET, FILM COATED ORAL
Status: DISCONTINUED | OUTPATIENT
Start: 2017-10-10 | End: 2017-10-11 | Stop reason: HOSPADM

## 2017-10-10 RX ORDER — HYDROXYZINE PAMOATE 50 MG/1
25 CAPSULE ORAL
COMMUNITY
End: 2021-06-04

## 2017-10-10 RX ORDER — FACIAL-BODY WIPES
10 EACH TOPICAL DAILY PRN
Status: DISCONTINUED | OUTPATIENT
Start: 2017-10-12 | End: 2017-10-11 | Stop reason: HOSPADM

## 2017-10-10 RX ORDER — TOPIRAMATE 25 MG/1
50 TABLET ORAL
Status: DISCONTINUED | OUTPATIENT
Start: 2017-10-10 | End: 2017-10-11 | Stop reason: HOSPADM

## 2017-10-10 RX ORDER — HYDROMORPHONE HYDROCHLORIDE 2 MG/ML
INJECTION, SOLUTION INTRAMUSCULAR; INTRAVENOUS; SUBCUTANEOUS AS NEEDED
Status: DISCONTINUED | OUTPATIENT
Start: 2017-10-10 | End: 2017-10-10 | Stop reason: HOSPADM

## 2017-10-10 RX ORDER — ONDANSETRON 2 MG/ML
4 INJECTION INTRAMUSCULAR; INTRAVENOUS AS NEEDED
Status: DISCONTINUED | OUTPATIENT
Start: 2017-10-10 | End: 2017-10-10 | Stop reason: HOSPADM

## 2017-10-10 RX ORDER — NALOXONE HYDROCHLORIDE 0.4 MG/ML
0.4 INJECTION, SOLUTION INTRAMUSCULAR; INTRAVENOUS; SUBCUTANEOUS AS NEEDED
Status: DISCONTINUED | OUTPATIENT
Start: 2017-10-10 | End: 2017-10-11 | Stop reason: HOSPADM

## 2017-10-10 RX ORDER — CEFAZOLIN SODIUM IN 0.9 % NACL 2 G/100 ML
2 PLASTIC BAG, INJECTION (ML) INTRAVENOUS EVERY 8 HOURS
Status: COMPLETED | OUTPATIENT
Start: 2017-10-10 | End: 2017-10-11

## 2017-10-10 RX ADMIN — DEXMEDETOMIDINE HYDROCHLORIDE 5 MCG: 4 INJECTION, SOLUTION INTRAVENOUS at 15:27

## 2017-10-10 RX ADMIN — ROCURONIUM BROMIDE 10 MG: 10 INJECTION, SOLUTION INTRAVENOUS at 14:59

## 2017-10-10 RX ADMIN — DIAZEPAM 5 MG: 5 TABLET ORAL at 19:28

## 2017-10-10 RX ADMIN — ACETAMINOPHEN 1000 MG: 500 TABLET ORAL at 12:29

## 2017-10-10 RX ADMIN — FENTANYL CITRATE 25 MCG: 50 INJECTION, SOLUTION INTRAMUSCULAR; INTRAVENOUS at 16:41

## 2017-10-10 RX ADMIN — DEXAMETHASONE SODIUM PHOSPHATE 8 MG: 4 INJECTION, SOLUTION INTRA-ARTICULAR; INTRALESIONAL; INTRAMUSCULAR; INTRAVENOUS; SOFT TISSUE at 14:01

## 2017-10-10 RX ADMIN — ROCURONIUM BROMIDE 35 MG: 10 INJECTION, SOLUTION INTRAVENOUS at 13:39

## 2017-10-10 RX ADMIN — FAMOTIDINE 20 MG: 20 TABLET, FILM COATED ORAL at 19:27

## 2017-10-10 RX ADMIN — ACETAMINOPHEN 1000 MG: 500 TABLET ORAL at 23:32

## 2017-10-10 RX ADMIN — FENTANYL CITRATE 25 MCG: 50 INJECTION, SOLUTION INTRAMUSCULAR; INTRAVENOUS at 16:46

## 2017-10-10 RX ADMIN — SODIUM CHLORIDE, SODIUM LACTATE, POTASSIUM CHLORIDE, AND CALCIUM CHLORIDE: 600; 310; 30; 20 INJECTION, SOLUTION INTRAVENOUS at 15:50

## 2017-10-10 RX ADMIN — FENTANYL CITRATE 50 MCG: 50 INJECTION, SOLUTION INTRAMUSCULAR; INTRAVENOUS at 13:30

## 2017-10-10 RX ADMIN — MORPHINE SULFATE 2 MG: 10 INJECTION INTRAMUSCULAR; INTRAVENOUS; SUBCUTANEOUS at 17:32

## 2017-10-10 RX ADMIN — ONDANSETRON 4 MG: 2 INJECTION INTRAMUSCULAR; INTRAVENOUS at 14:01

## 2017-10-10 RX ADMIN — MORPHINE SULFATE 2 MG: 10 INJECTION INTRAMUSCULAR; INTRAVENOUS; SUBCUTANEOUS at 17:25

## 2017-10-10 RX ADMIN — MORPHINE SULFATE 2 MG: 10 INJECTION INTRAMUSCULAR; INTRAVENOUS; SUBCUTANEOUS at 17:45

## 2017-10-10 RX ADMIN — ACETAMINOPHEN 1000 MG: 500 TABLET ORAL at 19:28

## 2017-10-10 RX ADMIN — FENTANYL CITRATE 50 MCG: 50 INJECTION, SOLUTION INTRAMUSCULAR; INTRAVENOUS at 14:14

## 2017-10-10 RX ADMIN — OXYCODONE HYDROCHLORIDE 10 MG: 5 TABLET ORAL at 23:32

## 2017-10-10 RX ADMIN — DEXMEDETOMIDINE HYDROCHLORIDE 5 MCG: 4 INJECTION, SOLUTION INTRAVENOUS at 15:37

## 2017-10-10 RX ADMIN — PROPOFOL 200 MG: 10 INJECTION, EMULSION INTRAVENOUS at 13:30

## 2017-10-10 RX ADMIN — GLYCOPYRROLATE 0.5 MG: 0.2 INJECTION INTRAMUSCULAR; INTRAVENOUS at 15:48

## 2017-10-10 RX ADMIN — MORPHINE SULFATE 2 MG: 10 INJECTION INTRAMUSCULAR; INTRAVENOUS; SUBCUTANEOUS at 17:55

## 2017-10-10 RX ADMIN — GABAPENTIN 100 MG: 100 CAPSULE ORAL at 22:35

## 2017-10-10 RX ADMIN — MORPHINE SULFATE 2 MG: 10 INJECTION INTRAMUSCULAR; INTRAVENOUS; SUBCUTANEOUS at 17:03

## 2017-10-10 RX ADMIN — LIDOCAINE HYDROCHLORIDE 80 MG: 20 INJECTION, SOLUTION EPIDURAL; INFILTRATION; INTRACAUDAL; PERINEURAL at 13:30

## 2017-10-10 RX ADMIN — MORPHINE SULFATE 2 MG: 10 INJECTION INTRAMUSCULAR; INTRAVENOUS; SUBCUTANEOUS at 17:15

## 2017-10-10 RX ADMIN — CEFAZOLIN 2 G: 10 INJECTION, POWDER, FOR SOLUTION INTRAVENOUS; PARENTERAL at 22:34

## 2017-10-10 RX ADMIN — ROCURONIUM BROMIDE 10 MG: 10 INJECTION, SOLUTION INTRAVENOUS at 15:25

## 2017-10-10 RX ADMIN — HYDROMORPHONE HYDROCHLORIDE 0.5 MG: 1 INJECTION, SOLUTION INTRAMUSCULAR; INTRAVENOUS; SUBCUTANEOUS at 18:00

## 2017-10-10 RX ADMIN — OXYCODONE HYDROCHLORIDE 10 MG: 5 TABLET ORAL at 20:15

## 2017-10-10 RX ADMIN — MORPHINE SULFATE 2 MG: 10 INJECTION INTRAMUSCULAR; INTRAVENOUS; SUBCUTANEOUS at 17:20

## 2017-10-10 RX ADMIN — DOCUSATE SODIUM AND SENNOSIDES 1 TABLET: 8.6; 5 TABLET, FILM COATED ORAL at 19:27

## 2017-10-10 RX ADMIN — KETAMINE HYDROCHLORIDE 20 MG: 100 INJECTION, SOLUTION INTRAMUSCULAR; INTRAVENOUS at 14:23

## 2017-10-10 RX ADMIN — MIDAZOLAM HYDROCHLORIDE 2 MG: 1 INJECTION, SOLUTION INTRAMUSCULAR; INTRAVENOUS at 13:22

## 2017-10-10 RX ADMIN — NEOSTIGMINE METHYLSULFATE 3 MG: 1 INJECTION INTRAVENOUS at 15:48

## 2017-10-10 RX ADMIN — CEFAZOLIN 2 G: 1 INJECTION, POWDER, FOR SOLUTION INTRAMUSCULAR; INTRAVENOUS; PARENTERAL at 13:32

## 2017-10-10 RX ADMIN — SODIUM CHLORIDE, SODIUM LACTATE, POTASSIUM CHLORIDE, AND CALCIUM CHLORIDE 25 ML/HR: 600; 310; 30; 20 INJECTION, SOLUTION INTRAVENOUS at 12:18

## 2017-10-10 RX ADMIN — FENTANYL CITRATE 25 MCG: 50 INJECTION, SOLUTION INTRAMUSCULAR; INTRAVENOUS at 16:51

## 2017-10-10 RX ADMIN — DEXMEDETOMIDINE HYDROCHLORIDE 5 MCG: 4 INJECTION, SOLUTION INTRAVENOUS at 15:49

## 2017-10-10 RX ADMIN — TRAZODONE HYDROCHLORIDE 100 MG: 100 TABLET ORAL at 22:35

## 2017-10-10 RX ADMIN — HYDROMORPHONE HYDROCHLORIDE 0.4 MG: 2 INJECTION, SOLUTION INTRAMUSCULAR; INTRAVENOUS; SUBCUTANEOUS at 14:18

## 2017-10-10 RX ADMIN — ROCURONIUM BROMIDE 5 MG: 10 INJECTION, SOLUTION INTRAVENOUS at 13:30

## 2017-10-10 RX ADMIN — MORPHINE SULFATE 2 MG: 10 INJECTION INTRAMUSCULAR; INTRAVENOUS; SUBCUTANEOUS at 17:40

## 2017-10-10 RX ADMIN — MORPHINE SULFATE 2 MG: 10 INJECTION INTRAMUSCULAR; INTRAVENOUS; SUBCUTANEOUS at 17:50

## 2017-10-10 RX ADMIN — DEXMEDETOMIDINE HYDROCHLORIDE 5 MCG: 4 INJECTION, SOLUTION INTRAVENOUS at 15:57

## 2017-10-10 RX ADMIN — DEXMEDETOMIDINE HYDROCHLORIDE 5 MCG: 4 INJECTION, SOLUTION INTRAVENOUS at 15:32

## 2017-10-10 RX ADMIN — TOPIRAMATE 50 MG: 25 TABLET ORAL at 22:35

## 2017-10-10 RX ADMIN — FENTANYL CITRATE 25 MCG: 50 INJECTION, SOLUTION INTRAMUSCULAR; INTRAVENOUS at 16:56

## 2017-10-10 RX ADMIN — MORPHINE SULFATE 2 MG: 10 INJECTION INTRAMUSCULAR; INTRAVENOUS; SUBCUTANEOUS at 17:10

## 2017-10-10 RX ADMIN — PREGABALIN 150 MG: 75 CAPSULE ORAL at 12:30

## 2017-10-10 RX ADMIN — SODIUM CHLORIDE 125 ML/HR: 900 INJECTION, SOLUTION INTRAVENOUS at 18:06

## 2017-10-10 RX ADMIN — SUCCINYLCHOLINE CHLORIDE 160 MG: 20 INJECTION INTRAMUSCULAR; INTRAVENOUS at 13:30

## 2017-10-10 NOTE — BRIEF OP NOTE
BRIEF OPERATIVE NOTE    Date of Procedure: 10/10/2017   Preoperative Diagnosis: CERVICALGIA, HNP, ARTHRODESIS  Postoperative Diagnosis: CERVICALGIA, HNP, ARTHRODESIS    Procedure(s):  C5-6 REVISION C6-7 ACDF  Surgeon(s) and Role:     * Ynug Clark MD - Primary         Assistant Staff:  Physician Assistant: Dorita Mason    Surgical Staff:  Circ-1: Nelson Brower RN  Physician Assistant: Dorita Mason  Radiology Technician: Marguerite Maldonado  Scrub Tech-1: Winford Phoenix White  Scrub Tech-Relief: [address] Staff: Radha Smith RN  Event Time In   Incision Start 1415   Incision Close      Anesthesia: General   Estimated Blood Loss: 50cc  Specimens: * No specimens in log *   Findings: stenosis/pseudoarthrosis   Complications: none  Implants:   Implant Name Type Inv.  Item Serial No.  Lot No. LRB No. Used Action   GRAFT [employer]NE [employer]  --  - [id number]  [employer]  --  [id number] MUSCULOSKELETAL TRANS [id number] N/A 1 Implanted   GRA[employer]  --  - [id number]  [employer]  --  [id number] MUSCULOSKELETAL TRANS [id number] N/A 1 Implanted   FLAT CERVICAL SPACER 12MM X 14MM   [id number]  [id number] N/A 1 Implanted   FLAT CERVICAL SPACER 14MM X 16MM   [id number]  [id number] N/A 1 Implanted   FLAT CERVICAL SPACER 14MM X 16MM     [id number]   [id number] N/A 1 Implanted

## 2017-10-10 NOTE — PROGRESS NOTES
Bedside shift change report given to Sergey Vega RN (oncoming nurse) by Maryan Baum RN (offgoing nurse). Report included the following information SBAR, Procedure Summary, Intake/Output, MAR and Recent Results.

## 2017-10-10 NOTE — H&P
Eva Zuniga MD   Orthopedic Surgery    Cervicalgia +5 more   Dx    Neck - Follow-up   Reason for visit    Progress notes   Expand All Collapse All       Subjective   Subjective:      Patient ID: Andrew Sarmiento is a 56 y.o. male.     Chief Complaint: Follow-up of the Neck        HPI:  Andrew Sarmiento is a 56 y.o. male with complaints of neck pain radiating down to the right upper extremity. The pain is numb stabbing sharp and week in quality. It has been present for 6 or 7 months and is there constantly. It is rated 7 out of 10 on the VAS. It is worse with using his right arm and better with rest.  He has been continued to work as a  performing exercises on a daily basis and has been taking ibuprofen.   Despite those modalities he has failed to see any relief in his symptoms.             Patient Active Problem List     Diagnosis Date Noted    Foraminal stenosis of cervical region 08/24/2017    Displacement of cervical intervertebral disc without myelopathy 08/24/2017    Cervical radiculopathy 08/24/2017    Cervicalgia 08/24/2017    Cervical spondylosis without myelopathy 08/24/2017               Family History   Problem Relation Age of Onset    No Known Problems Mother      Coronary artery disease Father                Social History   Substance Use Topics    Smoking status: Never Smoker    Smokeless tobacco: Never Used    Alcohol use No          Medical History         Past Medical History:   Diagnosis Date    Medical history non-contributory               Surgical History          Past Surgical History:   Procedure Laterality Date    NO RELEVANT ORTHOPAEDIC SURGERIES        NO RELEVANT SURGERIES                   Current Outpatient Prescriptions:     cephalexin (KEFLEX) 500 MG capsule, , Disp: , Rfl:     hydrOXYzine (ATARAX) 50 MG tablet, , Disp: , Rfl:     topiramate (TOPAMAX) 50 MG tablet, , Disp: , Rfl:     traZODone (DESYREL) 100 MG tablet, , Disp: , Rfl:    No Known Allergies      ROS:   No new bowel or bladder incontinence. No fever. No saddle anesthesia.              Objective   Objective: There were no vitals filed for this visit.     Body mass index is 28.37 kg/(m^2). , a BMI over 30 is considered obese and a BMI over 40 has been associated with a higher risk of surgical complications.     Constitutional: No acute distress. Well nourished. HEENT: Normocephalic. Respiratory:  No labored breathing. Cardiovascular:  No marked cyanosis. Skin:  No marked skin ulcers/lesions on bilateral upper or lower extremities. Psychiatric: Alert and oriented x3. Inspection: No gross deformity of bilateral upper or lower extremities. Musculoskeletal/Neurological:   He has 5/5 strength work muscle bilateral upper extremities except for the right triceps which is a 3/5. He has decreased sensation on the ulnar aspect of the forearm on the right. He has decreased triceps reflex. And he has a negative Guo's.        Radiographs:    I have personally reviewed the MRI and x-rays on our system. I agree with the findings and MRI. And x-rays I have seen a see 5 C6 noninstrumented fusion of with a pseudoarthrosis between the graft and the C6 vertebral body.      Mri Cervical Spine Without Contrast (78830)     Result Date: 8/15/2017  INDICATION:  NUMBNESS IN RIGHT HAND; CERVICAL DDD; C6-C7 COMPARISON: None. TECHNIQUE: MR imaging of the cervical spine was performed including sagittal T1, T2, STIR;  axial T1, T2.  FINDINGS: ALIGNMENT:  Normal. VERTEBRAL BODIES:  No compression fracture. MARROW SIGNAL:  Reactive endplate changes H6-6 and C6-7. No significant marrow edema. SPINAL CORD:  Normal course, caliber and signal. ADDITIONAL COMMENTS:  N/A.  C2/3:   The spinal canal and foramina are widely patent. C3/4:   The spinal canal and foramina are widely patent. C4/5:  Posterior disc osteophyte complex causes minimal if any spinal canal stenosis.  No significant foraminal stenosis. C5/6:  Broad-based left foraminal disc protrusion with endplate osteophytes and uncovertebral spurring results in severe left foraminal stenosis. C6/7:  Posterior disc osteophyte complex with uncovertebral spurring and facet degeneration results in mild spinal canal, severe right, and moderate left foraminal stenosis. C7/T1:   The spinal canal and foramina are widely patent. IMPRESSION: Degenerative changes most notable on the left at C5-6 with there is severe left foraminal stenosis, and at C6-7 where there is severe right and moderate left foraminal stenosis. See full description above. Dorita Sunil           Assessment   Assessment:      1. Cervicalgia    2. Cervical spondylosis without myelopathy    3. Cervical radiculopathy    4. Displacement of cervical intervertebral disc without myelopathy    5. Foraminal stenosis of cervical region    6. S/P spinal fusion                Plan   Plan:      He has a previous non instrumented fusion at C5-C6 that has gone to a pseudarthrosis with a clear black line between the graft and the superior endplate of C6. In addition she now has a right-sided C6-7 disc herniation compressing the right C7 nerve root resulting in marked weakness of his triceps. Given the fact that this has been going on now for over 6 months and has not improved with ibuprofen or continued exercise at the gym. It is reasonable to proceed with a surgery.  Surgery would involve a C6-C7 ACDF in addition we would try to repair the pseudoarthrosis at C5-C6.     I have discussed the procedure in detail with the patient and mentioned complications, including but not limited to: death, permanent disability, heart attack, stroke, lung injury or infection, blindness, ileus, bladder or bowel problems, ureter injury, bleeding, nerve injury (including numbness, pain and weakness), paralysis (which may be permanent), failure to heal, failure to fuse bone together in fusion procedures, failure to relief symptoms, failure to relief pain, increased pain, need for further surgeries, failure or breakage or hardware, malpositioning of hardware, need to fuse or operate on additional levels determined either during or after surgery, destabilization of the spine (which may require fusion or later surgery), infections (which may or may not require additional surgery), dural tears (tears of the sac holding in nerves and spinal fluid), meningitis, voice changes, vocal cord injury, hoarseness, blood clots, pulmonary embolus, Jovani syndrome, recurrent disc herniation, diaphragm paralysis, and anesthetic complications. Comorbidities such as obesity, smoking, rheumatoid arthritis, chronic steroid use and diabetes increase these risks. The patient understands and wants to proceed.            No orders of the defined types were placed in this encounter. Return for Follow up 2 weeks after surgery.            Sheela Mccloud MD     This note has been transcribed electronically using voice recognition and is believed to be accurate, but may contain errors secondary to technological limitations. Electronically signed by Sheela Mccloud MD at 8/24/2017 12:47 PM   Instructions        Return for Follow up 2 weeks after surgery.

## 2017-10-10 NOTE — PERIOP NOTES
Handoff Report from Operating Room to PACU    Report received from HOMAR Russell and Sotero Rodriguez CRNA regarding Nando Tunde II. Surgeon(s):  Trisha Ramos MD  And Procedure(s) (LRB):  C5-6 REVISION C6-7 ACDF (N/A)  confirmed   with drains and dressings discussed. Anesthesia type, drugs, patient history, complications, estimated blood loss, vital signs, intake and output, and last pain medication, lines and temperature were reviewed.

## 2017-10-10 NOTE — IP AVS SNAPSHOT
[address] [facility] 
[phone] Patient: Isaac Sims 
MRN: [id number] [facility]:0/64/5023 You are allergic to the following No active allergies Recent Documentation Height Weight BMI Smoking Status 1.88 m 102.1 kg 28.9 kg/m2 Never Smoker Emergency Contacts Name Discharge Info Relation Home Work Mobile Gilberto Mcqueen CAREGIVER [3] Parent [1] [phone] About your hospitalization You were admitted on:  October 10, 2017 You last received care in the:  [facility] ORTHOPEDICS You were discharged on:  October 11, 2017 Unit phone number:  [phone] Why you were hospitalized Your primary diagnosis was:  Not on File Your diagnoses also included:  Hnp (Herniated Nucleus Pulposus), Cervical  
  
  
 
  
  
Providers Seen During Your Hospitalizations Provider Role Specialty Primary office phone Yung Clark MD Attending Provider Orthopedic Surgery [phone] Your Primary Care Physician (PCP) Primary Care Physician Office Phone Office Fax Leonarda Jones  Follow-up Information Follow up With Details Comments Contact Info Yung Clark MD Schedule an appointment as soon as possible for a visit in 2 weeks  [address] [facility] 
[phone] Current Discharge Medication List  
  
START taking these medications Dose & Instructions Dispensing Information Comments Morning Noon Evening Bedtime  
 acetaminophen 500 mg tablet Commonly known as:  TYLENOL Your last dose was: Your next dose is:    
   
   
 Dose:  1000 mg Take 2 Tabs by mouth every six (6) hours for 14 days. Quantity:  112 Tab Refills:  0  
     
   
   
   
  
 diazePAM 5 mg tablet Commonly known as:  VALIUM Your last dose was: Your next dose is:    
   
   
 Dose:  5 mg Take 1 Tab by mouth every six (6) hours as needed. Max Daily Amount: 20 mg.  
 Quantity:  20 Tab Refills:  0  
     
   
   
   
  
 naloxone 4 mg/actuation nasal spray Commonly known as:  [employer] Your last dose was: Your next dose is:    
   
   
 Dose:  1 Spray 1 Crum Lynne by IntraNASal route as needed for Other (Respiratory depression). Give single spray into one nostril. Call 911. Give doses every 2 to 3 minutes, alternating nostrils, until assistance arrives using a new nasal spray with each dose, if patient does not respond or responds and then relapses Quantity:  1 Each Refills:  0  
     
   
   
   
  
 oxyCODONE IR 5 mg immediate release tablet Commonly known as:  Charolet Music Your last dose was: Your next dose is:    
   
   
 Dose:  5-10 mg Take 1-2 Tabs by mouth every three (3) hours as needed. Max Daily Amount: 80 mg.  
 Quantity:  80 Tab Refills:  0  
     
   
   
   
  
 polyethylene glycol 17 gram/dose powder Commonly known as:  Nilton Sport Your last dose was: Your next dose is:    
   
   
 Dose:  17 g Take 17 g by mouth daily for 15 days. Quantity:  255 g Refills:  0  
     
   
   
   
  
 senna-docusate 8.6-50 mg per tablet Commonly known as:  SENNA PLUS Your last dose was: Your next dose is:    
   
   
 Dose:  1 Tab Take 1 Tab by mouth two (2) times a day. Quantity:  60 Tab Refills:  0  
     
   
   
   
  
 tamsulosin 0.4 mg capsule Commonly known as:  FLOMAX Your last dose was: Your next dose is:    
   
   
 Dose:  0.4 mg Take 1 Cap by mouth daily for 7 days. Begin tonight 10/11 and take in the evening. Quantity:  7 Cap Refills:  0 CONTINUE these medications which have NOT CHANGED Dose & Instructions Dispensing Information Comments Morning Noon Evening Bedtime  
 hydrOXYzine pamoate 50 mg capsule Commonly known as:  VISTARIL Your last dose was: Your next dose is:    
   
   
 Dose:  25 mg Take 25 mg by mouth three (3) times daily as needed for Itching or Anxiety. Refills:  0  
     
   
   
   
  
 topiramate 50 mg tablet Commonly known as:  TOPAMAX Your last dose was: Your next dose is:    
   
   
 Dose:  50 mg Take 1 Tab by mouth nightly. Quantity:  30 Tab Refills:  3  
     
   
   
   
  
 traZODone 100 mg tablet Commonly known as:  Triston Burt Your last dose was: Your next dose is:    
   
   
 Dose:  100 mg Take 1 Tab by mouth nightly. Quantity:  30 Tab Refills:  3 Where to Get Your Medications Information on where to get these meds will be given to you by the nurse or doctor. ! Ask your nurse or doctor about these medications  
  acetaminophen 500 mg tablet  
 diazePAM 5 mg tablet  
 naloxone 4 mg/actuation nasal spray  
 oxyCODONE IR 5 mg immediate release tablet  
 polyethylene glycol 17 gram/dose powder  
 senna-docusate 8.6-50 mg per tablet  
 tamsulosin 0.4 mg capsule Discharge Instructions 4 [facility] Discharge Instruction Sheet: Anterior Cervical Fusion Artem Melo Pain control: 
Typically, we will prescribe a narcotic usually 1-2 tabs every four hours is sufficient for the pain. Most patients need this only for the first few weeks. You should discontinue this as the pain decreases. You should not drive while taking any narcotic pain medications. Constipation Pain medicines and anesthesia can be constipating-this can be prevented by gentle physical activity and drinking plenty of fluid. It should be treated with over-the-counter medications such as Miralax or suppositories, and/or Fleets enema.  You should have a bowel movement at least every other day following surgery. Incision care Keep this area clean and dry. Do not remove the dressing. DO NOT take a tub bath or go swimming until cleared by your doctor. DO NOT apply lotions, oils, or creams to incision. Cover the wound with an impermiable dressing to shower for then next 5 days, then no cover is needed. Wear cervical collar for comfort. If staples are in place, they should be removed about 14-20 days after surgery. To increase and promote healing: 
? Stop Smoking (or at least cut back on smoking). ? Eat a well-balanced diet (high in protein and vitamin C) ? If your appetite is poor, consider nutritional supplements like Ensure, Glucerna, or [place] Instant Breakfast. 
? If you are diabetic, controlling you blood sugars is very important to prevent infection and promote wound healing. Nutrition: ? If you were on a supplement such as Ensure or Glucerna) while in the hospital, please continue using them with each meal for the next 30 days. ? Eat a well-balanced diet - High in protein, high in vitamins and minerals, especially vitamin C and zinc.  
 
Restrictions: 
Limited bending at waist 
Lift no more than 10 pounds Warning signs :  
Please call your physician IMMEDIATELY at [phone] if you have: ? If you have throat soreness that worsens ? If you have difficulty swallowing. ? If you have any difficulty breathing ? Bleeding from incision that is constant. ? Change in mental status (unusual behavior or confusion) ? If your incision develops redness or swelling 
? Change in wound drainage (increase in amount, color, or foul odor) ? [place] over 101.5 degrees Fahrenheit  
? Headache that is not relieved with pain medication ? Tenderness or redness in the calf of your leg Emergency: CALL 911 if you have: ? Any Difficulty Breathing or Shortness of breath ? Difficulty Swallowing ? Chest pain ? Localized chest pain when coughing or taking a deep breath Roberto Revel II Follow-up Please call Dr. Sindy Mayorga office for a follow up appointment in 2 weeks at [phone]. You can return to work when cleared by a physician. During normal business hours you may reach Dr. Maru Randolph' team directly at [phone] if you have concerns or questions. Discharge Orders None [facility] [employer] Announcement We are excited to announce that we are making your provider's discharge notes available to you in [employer]. You will see these notes when they are completed and signed by the physician that discharged you from your recent hospital stay. If you have any questions or concerns about any information you see in [facility] [employer], please call the Health Information Department where you were seen or reach out to your Primary Care Provider for more information about your plan of care. Introducing [facility]! Bryanna Donaldson introduces [employer] patient portal. Now you can access parts of your medical record, email your doctor's office, and request medication refills online. 1. In your internet browser, go to [email or web address] 2. Click on the First Time User? Click Here link in the Sign In box. You will see the New Member Sign Up page. 3. Enter your [facility] [employer] [facility] Access Code exactly as it appears below. You will not need to use this code after youve completed the sign-up process. If you do not sign up before the expiration date, you must request a new code. · [facility] [employer] Access Code: [email or web address] Expires: 12/31/2017  3:26 PM 
 
4. Enter the last four digits of your Social Security Number (xxxx) and Date of Birth (mm/dd/yyyy) as indicated and click Submit. You will be taken to the next sign-up page. 5. Create a [facility] [employer] [facility] ID. This will be your [facility] [employer] [facility] login ID and cannot be changed, so think of one that is secure and easy to remember. 6. Create a [employer] password. You can change your password at any time. 7. Enter your Password Reset Question and Answer. This can be used at a later time if you forget your password. 8. Enter your e-mail address. You will receive e-mail notification when new information is available in [address] 9. Click Sign Up. You can now view and download portions of your medical record. 10. Click the Download Summary menu link to download a portable copy of your medical information. If you have questions, please visit the Frequently Asked Questions section of the [employer] website. Remember, [employer] is NOT to be used for urgent needs. For medical emergencies, dial 911. Now available from your iPhone and Android! General Information Please provide this summary of care documentation to your next provider. Patient Signature:  ____________________________________________________________ Date:  ____________________________________________________________  
  
[facility] Provider Signature:  ____________________________________________________________ Date:  ____________________________________________________________

## 2017-10-10 NOTE — ANESTHESIA POSTPROCEDURE EVALUATION
Post-Anesthesia Evaluation and Assessment    Patient: Hang Yanes MRN: [id number]  SSN: [id number]    YOB: 1970  Age: 56 y.o. Sex: male       Cardiovascular Function/Vital Signs  Visit Vitals    /79    Pulse 77    Temp 37.1 °C (98.7 °F)    Resp 18    Ht 6' 2\" (1.88 m)    Wt 102.1 kg (225 lb 1.4 oz)    SpO2 99%    BMI 28.9 kg/m2       Patient is status post general anesthesia for Procedure(s):  C5-6 REVISION C6-7 ACDF. Nausea/Vomiting: None    Postoperative hydration reviewed and adequate. Pain:  Pain Scale 1: Numeric (0 - 10) (10/10/17 1720)  Pain Intensity 1: 6 (10/10/17 1720)   Managed    Neurological Status:   Neuro (WDL): Exceptions to WDL (10/10/17 1626)  Neuro  Neurologic State: Drowsy (10/10/17 1626)  Orientation Level: Oriented to person (10/10/17 1626)  Cognition: Decreased attention/concentration (10/10/17 1626)  Speech: Appropriate for age;Clear (10/10/17 1213)  LUE Motor Response: Purposeful (10/10/17 1213)  LLE Motor Response: Purposeful (10/10/17 1213)  RUE Motor Response: Purposeful;Numbness;Weak;Tingling (10/10/17 1213)  RLE Motor Response: Purposeful (10/10/17 1213)   At baseline    Mental Status and Level of Consciousness: Arousable    Pulmonary Status:   O2 Device: Nasal cannula (10/10/17 1715)   Adequate oxygenation and airway patent    Complications related to anesthesia: None    Post-anesthesia assessment completed.  No concerns    Signed By: Conrad Olvera MD     October 10, 2017

## 2017-10-10 NOTE — ANESTHESIA PREPROCEDURE EVALUATION
Anesthetic History   No history of anesthetic complications            Review of Systems / Medical History  Patient summary reviewed, nursing notes reviewed and pertinent labs reviewed    Pulmonary  Within defined limits                 Neuro/Psych         Psychiatric history     Cardiovascular  Within defined limits                Exercise tolerance: >4 METS     GI/Hepatic/Renal  Within defined limits              Endo/Other  Within defined limits           Other Findings              Physical Exam    Airway  Mallampati: II  TM Distance: 4 - 6 cm  Neck ROM: normal range of motion   Mouth opening: Normal     Cardiovascular  Regular rate and rhythm,  S1 and S2 normal,  no murmur, click, rub, or gallop             Dental  No notable dental hx       Pulmonary  Breath sounds clear to auscultation               Abdominal  GI exam deferred       Other Findings            Anesthetic Plan    ASA: 2  Anesthesia type: general

## 2017-10-11 ENCOUNTER — APPOINTMENT (OUTPATIENT)
Dept: GENERAL RADIOLOGY | Age: 47
End: 2017-10-11
Attending: ORTHOPAEDIC SURGERY
Payer: COMMERCIAL

## 2017-10-11 VITALS
RESPIRATION RATE: 18 BRPM | TEMPERATURE: 98.2 F | SYSTOLIC BLOOD PRESSURE: 155 MMHG | HEIGHT: 74 IN | OXYGEN SATURATION: 95 % | BODY MASS INDEX: 28.89 KG/M2 | HEART RATE: 98 BPM | WEIGHT: 225.09 LBS | DIASTOLIC BLOOD PRESSURE: 89 MMHG

## 2017-10-11 PROCEDURE — 51798 US URINE CAPACITY MEASURE: CPT

## 2017-10-11 PROCEDURE — 77030011943

## 2017-10-11 PROCEDURE — 74011250637 HC RX REV CODE- 250/637: Performed by: NURSE PRACTITIONER

## 2017-10-11 PROCEDURE — 72040 X-RAY EXAM NECK SPINE 2-3 VW: CPT

## 2017-10-11 PROCEDURE — 74011250637 HC RX REV CODE- 250/637: Performed by: ORTHOPAEDIC SURGERY

## 2017-10-11 PROCEDURE — 74011250636 HC RX REV CODE- 250/636: Performed by: ORTHOPAEDIC SURGERY

## 2017-10-11 PROCEDURE — 99218 HC RM OBSERVATION: CPT

## 2017-10-11 RX ORDER — ACETAMINOPHEN 500 MG
1000 TABLET ORAL EVERY 6 HOURS
Qty: 112 TAB | Refills: 0 | Status: SHIPPED | OUTPATIENT
Start: 2017-10-11 | End: 2017-10-25

## 2017-10-11 RX ORDER — AMOXICILLIN 250 MG
1 CAPSULE ORAL 2 TIMES DAILY
Qty: 60 TAB | Refills: 0 | Status: SHIPPED | OUTPATIENT
Start: 2017-10-11 | End: 2021-06-04

## 2017-10-11 RX ORDER — OXYCODONE HYDROCHLORIDE 5 MG/1
5-10 TABLET ORAL
Qty: 80 TAB | Refills: 0 | Status: SHIPPED | OUTPATIENT
Start: 2017-10-11 | End: 2021-06-04

## 2017-10-11 RX ORDER — TAMSULOSIN HYDROCHLORIDE 0.4 MG/1
0.4 CAPSULE ORAL 2 TIMES DAILY
Status: DISCONTINUED | OUTPATIENT
Start: 2017-10-11 | End: 2017-10-11 | Stop reason: HOSPADM

## 2017-10-11 RX ORDER — POLYETHYLENE GLYCOL 3350 17 G/17G
17 POWDER, FOR SOLUTION ORAL DAILY
Qty: 255 G | Refills: 0 | Status: SHIPPED | OUTPATIENT
Start: 2017-10-11 | End: 2017-10-26

## 2017-10-11 RX ORDER — NALOXONE HYDROCHLORIDE 4 MG/.1ML
1 SPRAY NASAL AS NEEDED
Qty: 1 EACH | Refills: 0 | Status: SHIPPED | OUTPATIENT
Start: 2017-10-11 | End: 2021-06-04

## 2017-10-11 RX ORDER — DIAZEPAM 5 MG/1
5 TABLET ORAL
Qty: 20 TAB | Refills: 0 | Status: SHIPPED | OUTPATIENT
Start: 2017-10-11 | End: 2021-06-04

## 2017-10-11 RX ORDER — TAMSULOSIN HYDROCHLORIDE 0.4 MG/1
0.4 CAPSULE ORAL DAILY
Qty: 7 CAP | Refills: 0 | Status: SHIPPED | OUTPATIENT
Start: 2017-10-11 | End: 2017-10-18

## 2017-10-11 RX ADMIN — ACETAMINOPHEN 1000 MG: 500 TABLET ORAL at 11:02

## 2017-10-11 RX ADMIN — OXYCODONE HYDROCHLORIDE 10 MG: 5 TABLET ORAL at 08:54

## 2017-10-11 RX ADMIN — FAMOTIDINE 20 MG: 20 TABLET, FILM COATED ORAL at 08:55

## 2017-10-11 RX ADMIN — Medication 10 ML: at 13:37

## 2017-10-11 RX ADMIN — DIAZEPAM 5 MG: 5 TABLET ORAL at 10:02

## 2017-10-11 RX ADMIN — OXYCODONE HYDROCHLORIDE 10 MG: 5 TABLET ORAL at 15:23

## 2017-10-11 RX ADMIN — ACETAMINOPHEN 1000 MG: 500 TABLET ORAL at 05:39

## 2017-10-11 RX ADMIN — CEFAZOLIN 2 G: 10 INJECTION, POWDER, FOR SOLUTION INTRAVENOUS; PARENTERAL at 05:39

## 2017-10-11 RX ADMIN — GABAPENTIN 100 MG: 100 CAPSULE ORAL at 15:24

## 2017-10-11 RX ADMIN — OXYCODONE HYDROCHLORIDE 10 MG: 5 TABLET ORAL at 12:19

## 2017-10-11 RX ADMIN — POLYETHYLENE GLYCOL 3350 17 G: 17 POWDER, FOR SOLUTION ORAL at 08:56

## 2017-10-11 RX ADMIN — DIAZEPAM 5 MG: 5 TABLET ORAL at 01:42

## 2017-10-11 RX ADMIN — TAMSULOSIN HYDROCHLORIDE 0.4 MG: 0.4 CAPSULE ORAL at 08:55

## 2017-10-11 RX ADMIN — GABAPENTIN 100 MG: 100 CAPSULE ORAL at 08:54

## 2017-10-11 RX ADMIN — DEXAMETHASONE SODIUM PHOSPHATE 10 MG: 4 INJECTION, SOLUTION INTRAMUSCULAR; INTRAVENOUS at 13:38

## 2017-10-11 RX ADMIN — OXYCODONE HYDROCHLORIDE 10 MG: 5 TABLET ORAL at 05:39

## 2017-10-11 RX ADMIN — Medication 10 ML: at 05:39

## 2017-10-11 RX ADMIN — OXYCODONE HYDROCHLORIDE 10 MG: 5 TABLET ORAL at 02:26

## 2017-10-11 RX ADMIN — DOCUSATE SODIUM AND SENNOSIDES 1 TABLET: 8.6; 5 TABLET, FILM COATED ORAL at 08:56

## 2017-10-11 NOTE — PROGRESS NOTES
ORTHO POST OP SPINE PROGRESS NOTE    2017  Admit Date: 10/10/2017  Admit Diagnosis: CERVICALGIA, HNP, ARTHRODESIS  HNP (herniated nucleus pulposus), cervical  Procedure: Procedure(s):  C5-6 REVISION C6-7 ACDF  Post Op day: 1 Day Post-Op    Subjective:     Lars Jaramillo II is a patient who has complaints of some neck pain, improved RUE pain s/p Rev C5-6, C6-7 ACDF. while he has been able to void some, he did need straight cath last night and has some retention this morning. tolerating po . Review of Systems: Pertinent items are noted in HPI. Objective:     PT/OT:   Distance Ambulated:           Time Ambulated (min):        Ambulation Response: Activity Response: Tolerated well  Assistive Device:              Assistive Device: Fall prevention device, Walker (comment)    Vital Signs:    Blood pressure 140/80, pulse 84, temperature 97.9 °F (36.6 °C), resp. rate 18, height 6' 2\" (1.88 m), weight 102.1 kg (225 lb 1.4 oz), SpO2 97 %. Temp (24hrs), Av °F (36.7 °C), Min:97.6 °F (36.4 °C), Max:98.7 °F (37.1 °C)         10/09 1901 - 10/11 0700  In: 1118.8 [I.V.:1118.8]  Out:  [Urine:1900; Drains:45]    LAB:    No results for input(s): HGB, HGBEXT, WBC, PLT, PLTEXT, HGBEXT, PLTEXT in the last 72 hours.     Wound/Drain Assessment:  Drain:      Dressing:     Physical Exam:  Neurological: no deficit  Incision clean, dry, and intact  5/5 BUE    Assessment:      Patient Active Problem List   Diagnosis Code    Right groin pain R10.31    Alcohol dependence ([facility] [address] F10.20    Torn meniscus S83.209A    Right anterior shoulder pain M25.511    Basal cell carcinoma C44.91    Depression, acute F32.9    Alcohol abuse F10.10    ISABEL (generalized anxiety disorder) F41.1    Sleep disorder G47.9    Annual physical exam Z00.00    Varicocele I86.1    Back pain M54.9    Right cervical radiculopathy M54.12    HNP (herniated nucleus pulposus), cervical M50.20       Plan:     Continue PT/OT/Rehab  Discontinue: IV and drain (neck)  Consult: PT  and OT    Discharge To: home. today.  pending ability to void       Signed By: SHAR Valencia

## 2017-10-11 NOTE — PROGRESS NOTES
Discharge teaching completed with patient. He received a hard copy of his perscriptions, an AVS printout, as well as medication information. IV and drain were removed. No questions were noted. Patient was given contact information for future questions or concerns.

## 2017-10-11 NOTE — DISCHARGE INSTRUCTIONS
[facility]    Discharge Instruction Sheet: Anterior Cervical Fusion    DR. Lucinda Barron    Pain control:  Typically, we will prescribe a narcotic usually 1-2 tabs every four hours is sufficient for the pain. Most patients need this only for the first few weeks. You should discontinue this as the pain decreases. You should not drive while taking any narcotic pain medications. Constipation  Pain medicines and anesthesia can be constipating-this can be prevented by gentle physical activity and drinking plenty of fluid. It should be treated with over-the-counter medications such as Miralax or suppositories, and/or Fleets enema. You should have a bowel movement at least every other day following surgery. Incision care   Keep this area clean and dry. Do not remove the dressing. DO NOT take a tub bath or go swimming until cleared by your doctor. DO NOT apply lotions, oils, or creams to incision. Cover the wound with an impermiable dressing to shower for then next 5 days, then no cover is needed. Wear cervical collar for comfort. If staples are in place, they should be removed about 14-20 days after surgery. To increase and promote healing:   Stop Smoking (or at least cut back on smoking).  Eat a well-balanced diet (high in protein and vitamin C)   If your appetite is poor, consider nutritional supplements like Ensure, Glucerna, or [place] Instant Breakfast.   If you are diabetic, controlling you blood sugars is very important to prevent infection and promote wound healing. Nutrition:   If you were on a supplement such as Ensure or Glucerna) while in the hospital, please continue using them with each meal for the next 30 days.    Eat a well-balanced diet - High in protein, high in vitamins and minerals, especially vitamin C and zinc.     Restrictions:  Limited bending at waist  Lift no more than 10 pounds    Warning signs :   Please call your physician IMMEDIATELY at [phone] if you have:   If you have throat soreness that worsens   If you have difficulty swallowing.  If you have any difficulty breathing   Bleeding from incision that is constant.  Change in mental status (unusual behavior or confusion)   If your incision develops redness or swelling   Change in wound drainage (increase in amount, color, or foul odor)   [place] over 101.5 degrees Fahrenheit    Headache that is not relieved with pain medication   Tenderness or redness in the calf of your leg    Emergency: CALL 911 if you have:   Any Difficulty Breathing or Shortness of breath   Difficulty Swallowing   Chest pain   Localized chest pain when coughing or taking a deep breath    Jeanette [place] II    Follow-up  Please call Dr. Joel Garcia office for a follow up appointment in 2 weeks at [phone]. You can return to work when cleared by a physician. During normal business hours you may reach Dr. Triston De Oliveira' team directly at [phone] if you have concerns or questions.

## 2017-10-11 NOTE — PROGRESS NOTES
Ortho/ NeuroSurgery NP Note    Subjective:  Nursing reports patient has free voided 275 and then 300. After the second void the post-void bladder scan was 200 which is improved from this morning. The patient feels the stream is more steady and stronger. He reports ongoing dysuria related to the straight caths that were done. Objective:  N/A    Assessment:  Patient is improving since starting Flomax and is not retaining as much urine. He will not need another straight or indwelling cath. Plan:  Send home with Flomax for one week. Dr. Verner Asp aware and in agreement with plan.      Stanley Albarado, IVAN

## 2017-10-11 NOTE — PROGRESS NOTES
Physical therapy  Consult received and chart reviewed. Per NP, patient is up ad rafaela and has not PT needs at this time. Will complete order.   Thank you,  Becca Duffy, PT

## 2017-10-11 NOTE — PROGRESS NOTES
Problem: Simple Spine Procedure: Day of Surgery  Goal: Treatments/Interventions/Procedures  Outcome: Resolved/Met Date Met:  10/11/17  Leonore collar present, NV intact, dressing CDI, SCDs present on patient,

## 2017-10-11 NOTE — OP NOTES
[address], [facility]   OP NOTE       Name:  Debra Love   MR#:  [id number]   :  1970   Account #:  [de-identified]    Surgery Date:  10/10/2017   Date of Adm:  10/10/2017       PREOPERATIVE DIAGNOSES   1. Cervical spine pseudoarthrosis at C5-C6. 2. Cervical disk herniation, C6-C7. 3. Cervical radiculopathy with weakness on the right upper extremity. 4. Cervicalgia. POSTOPERATIVE DIAGNOSES   1. Cervical spine pseudoarthrosis at C5-C6. 2. Cervical disk herniation, C6-C7. 3. Cervical radiculopathy with weakness on the right upper extremity. 4. Cervicalgia. PROCEDURES PERFORMED   1. C6-C7 anterior cervical diskectomy and fusion. 2. C6-C7 insertion of interbody biomechanical device. 3. C5-C6 anterior cervical fusion without decompression for repair of   pseudoarthrosis. 4. Insertion of interbody biomechanical device, C5-C6.   5. Use of allograft bone for spine fusion. 6. Use of operative microscope. 7. Anterior cervical instrumentation, C5, C6, C7.    SURGEON: Phoebe Garcia MD.    FIRST ASSISTANT: Ha Mejia. ESTIMATED BLOOD LOSS: 50 mL. COMPLICATIONS: None. SPECIMENS REMOVED: None. ANESTHESIA: General.    DRAINS: x1. IMPLANTS: The DePuy Synthes [place] plate and the Nanovis   FortiCore interbody biomechanical device. INDICATIONS FOR THE PROCEDURE: The patient is a 31-year-old   gentleman with a C5-C6 pseudoarthrosis and a C6-C7 disk herniation   resulting in cervicalgia and cervical radiculopathy that has failed to   improve with nonoperative treatment. At this point, he would like to   proceed with surgical intervention. We have given him warnings about   the possible complications, including but not limited to pain, scar,   bleeding, infection, nonunion, damage to surrounding structures,   death, paralysis, blindness, stroke. He understands and wants to   proceed.     NARRATIVE PROCEDURE: After informed consent was obtained and   the operative site was properly marked, the patient was moved back to   the operating room and underwent general endotracheal anesthesia. He was positioned supine on the operating table using the Atul Stade   table 4 poster flat top. The arms were well padded and tucked to the   side. The knees were gently bent with pillows. A bump was placed   between the shoulder blades and the shoulders were gently taped   down with 3-inch tape. The head halter was applied with 10 pounds of   skin traction. We then proceeded to use fluoroscopy to flavia the level   of the incision. I then proceeded to prep and drape in the usual   manner, followed by obtaining a time-out, verifying that this was the   correct patient, the correct surgery, the correct site, as well as that he   had received IV antibiotics within 30 minutes of the incision. I then proceeded to perform a standard anterior approach to the   cervical spine, exposing the area between C5 and C7. Once that area   was exposed and hemostasis was obtained, fluoroscopy was used to   verify that we were in the correct level. I then proceeded to elevate the   longus colli muscles on both right and left sides, protecting the   sympathetic chain and exposing the uncinate processes bilaterally. I   was able then to place a Shadow-Line retractor and [place] pins at C5   and C6, and applied gentle axial distraction. The pseudoarthrosis was   then found and using the Midas [place], I was able to drill out the previous   graft and the fibrous nonunion. I was able to take this all the way down   to the posterior aspect of the vertebral body, creating a nice disk   space. I was able then to make sure that there was no cartilaginous or   fibrous material in that area, just bleeding bone, and I was able then to   insert a spacer in position after measuring for it and packed it with   Shanon allograft bone.  Please note that this was done under microscopic visualization. Once this was completed, the Lee pin   was moved down from C5 to C7. With the microscope in place, I   proceeded to perform a box annulotomy with a 15 blade and removed   the annulus with a pituitary and removed the remainder of the disk with   a pituitary and a curette, leaving behind punctate bleeding bone. Once I   reached the PLL , the PLL was removed with a Kerrison #1 followed by   Kerrison #2 and the disk herniation was removed from the neural   foramen on the right. Once that area was fully decompressed and   inspected with a Seaman ball I proceeded to size the interbody space   for a spacer with a size 6 having a good fit. The spacer was then   inserted, filled with Shanon allograft bone in the right position. I   proceeded then to remove the Lee pins, irrigating with 3 L of   normal saline. I proceeded to place a small [place] plate and drill for   14 mm screws at C5, C6 and C7, placing the screws in place and   locking them with the final locking device. Once that area was fully   instrumented, I placed the remainder of the bone graft on the plate   windows and I proceeded to place a deep drain, close the platysma   with 2-0 Vicryl, subcutaneous with 3-0 Vicryl, and the skin with a 3-0   running Monocryl and Dermabond. Sterile dressing was applied. The   patient was then awakened and transferred to the PACU in stable   condition. POSTOPERATIVE PLAN: The patient is going to remain here   overnight. We are going to give him SCDs and TAMMY hose for DVT   prophylaxis and Ancef for infection prophylaxis.         MD GOPAL Corona / Bev Rick   D:  10/10/2017   15:49   T:  10/10/2017   19:50   Job #:  [id number]     Dr. Yvonne Carrillo

## 2019-09-24 PROBLEM — Z00.00 ANNUAL PHYSICAL EXAM: Status: RESOLVED | Noted: 2017-10-02 | Resolved: 2019-09-24

## 2020-06-25 ENCOUNTER — HOSPITAL ENCOUNTER (EMERGENCY)
Age: 50
Discharge: HOME | End: 2020-06-25
Attending: EMERGENCY MEDICINE
Payer: SELF-PAY

## 2020-06-25 VITALS
TEMPERATURE: 98.1 F | OXYGEN SATURATION: 92 % | HEIGHT: 74 IN | SYSTOLIC BLOOD PRESSURE: 118 MMHG | RESPIRATION RATE: 18 BRPM | WEIGHT: 220 LBS | BODY MASS INDEX: 28.23 KG/M2 | HEART RATE: 111 BPM | DIASTOLIC BLOOD PRESSURE: 86 MMHG

## 2020-06-25 DIAGNOSIS — F10.920 ALCOHOLIC INTOXICATION WITHOUT COMPLICATION (HCC): Primary | ICD-10-CM

## 2020-06-25 PROCEDURE — 99284 EMERGENCY DEPT VISIT MOD MDM: CPT

## 2020-06-26 NOTE — ED NOTES
Pt encouraged to eat bag meal and drink fluids provided. Pt states \"okay,\" but closes eyes and appears to go to sleep. Will continue to monitor.

## 2020-06-26 NOTE — ED PROVIDER NOTES
78-year-old male with a history of alcohol abuse, anxiety, back pain, depression presents via EMS after a bystander to her saw him sleeping in a parking lot and called 911. He was found to be intoxicated, blowing a 0.272? And was given the option of assisted or hospital.  He chose hospital.  He denies pain, fever, breathing problems. He tells me he drank too much. He tells me the year and his last name. Tells me he knows where he is but does not answer.            Past Medical History:   Diagnosis Date    Alcohol abuse 10/2/2017    Annual physical exam 10/2/2017    Anxiety     Back pain 10/2/2017    Basal cell carcinoma 10/2/2017    Depression, acute 10/2/2017    ISABEL (generalized anxiety disorder) 10/2/2017    Mood disorder ([address]     on topiramate, hx of alcoholism    Right anterior shoulder pain 10/2/2017    Right cervical radiculopathy 10/2/2017    Sleep disorder 10/2/2017    Torn meniscus 10/2/2017    Varicocele 10/2/2017       Past Surgical History:   Procedure Laterality Date    HX HERNIA REPAIR  2006   [address]    [facility]  2004    Disc Fusion C5-C6 (Dr. Ori Porter, [address] [place] [address])         Family History:   Problem Relation Age of Onset    Heart Disease Father     Hypertension Father     Diabetes Maternal Uncle     Cancer Maternal Uncle     Heart Disease Maternal Grandmother     Hypertension Maternal Grandmother     Heart Disease Maternal Grandfather     Hypertension Maternal Grandfather     Heart Disease Paternal Grandmother     Hypertension Paternal Grandmother     Cancer Paternal Grandmother         lung    Heart Disease Paternal Grandfather     Hypertension Paternal Grandfather        Social History     Socioeconomic History    Marital status: SINGLE     Spouse name: Not on file    Number of children: Not on file    Years of education: Not on file    Highest education level: Not on file   Occupational History    Not on file   Social Needs    Financial resource strain: Not on file    Food insecurity     Worry: Not on file     Inability: Not on file    Transportation needs     Medical: Not on file     Non-medical: Not on file   Tobacco Use    Smoking status: Never Smoker    Smokeless tobacco: Current User   Substance and Sexual Activity    Alcohol use: Yes     Comment: rarely per pt (hx of ETOH abuse)    Drug use: No    Sexual activity: Not on file   Lifestyle    Physical activity     Days per week: Not on file     Minutes per session: Not on file    Stress: Not on file   Relationships    Social connections     Talks on phone: Not on file     Gets together: Not on file     Attends [employer] service: Not on file     Active member of club or organization: Not on file     Attends meetings of clubs or organizations: Not on file     Relationship status: Not on file    Intimate partner violence     Fear of current or ex partner: Not on file     Emotionally abused: Not on file     Physically abused: Not on file     Forced sexual activity: Not on file   Other Topics Concern    Not on file   Social History Narrative    Not on file         ALLERGIES: Patient has no known allergies. Review of Systems   Constitutional: Positive for activity change. Negative for fever. HENT: Negative. Negative for drooling, facial swelling and trouble swallowing. Eyes: Negative. Negative for discharge and redness. Respiratory: Negative. Negative for chest tightness, shortness of breath and wheezing. Cardiovascular: Negative. Negative for chest pain. Gastrointestinal: Negative. Negative for abdominal distention, abdominal pain, constipation, diarrhea, nausea and vomiting. Endocrine: Negative. Genitourinary: Negative. Negative for difficulty urinating and dysuria. Musculoskeletal: Negative. Negative for arthralgias and myalgias. Skin: Negative. Negative for color change and rash. Allergic/Immunologic: Negative. Neurological: Negative. Negative for syncope, facial asymmetry and speech difficulty. Hematological: Negative. Psychiatric/Behavioral: Negative. Negative for agitation and confusion. All other systems reviewed and are negative. Vitals:    06/25/20 2020   BP: 143/82   Pulse: (!) 110   Resp: 18   Temp: 98.1 °F (36.7 °C)   SpO2: 96%   Weight: 99.8 kg (220 lb)   Height: 6' 2\" (1.88 m)            Physical Exam  Vitals signs and nursing note reviewed. Constitutional:       Appearance: Normal appearance. He is well-developed. Comments: Clinically intoxicated. HENT:      Head: Normocephalic and atraumatic. Eyes:      Conjunctiva/sclera: Conjunctivae normal.   Neck:      Musculoskeletal: Neck supple. Cardiovascular:      Rate and Rhythm: Normal rate and regular rhythm. Pulmonary:      Effort: No accessory muscle usage or respiratory distress. Abdominal:      Palpations: Abdomen is soft. Tenderness: There is no abdominal tenderness. Musculoskeletal: Normal range of motion. Skin:     General: Skin is warm and dry. Neurological:      Mental Status: He is alert and oriented to person, place, and time. Psychiatric:         Behavior: Behavior normal.         Thought Content: Thought content normal.          MDM  Number of Diagnoses or Management Options  Alcoholic intoxication without complication [facility]):   Diagnosis management comments: All intoxication without other complaints. Plan: Metabolize to freedom. ED Course as of Jun 26 0341   Thu Jun 25, 2020   2140 Patient easily ambulated to the bathroom with steady gait. A&O x3.    [SS]      ED Course User Index  [SS] Mague Joyce MD       Procedures    LABORATORY TESTS:  No results found for this or any previous visit (from the past 12 hour(s)). IMAGING RESULTS:  No orders to display       MEDICATIONS GIVEN:  Medications - No data to display    IMPRESSION:  1. Alcoholic intoxication without complication ([facility] [address]        PLAN:  1.    Discharge Medication List as of 6/25/2020  9:42 PM        2.    Follow-up Information     Follow up With Specialties Details Why Contact Info    Rod Jarquin MD Internal Medicine Schedule an appointment as soon as possible for a visit  [address] [place] [address] Drive      [address] [place] [address]  Schedule an appointment as soon as possible for a visit  [address]  [phone]        Return to ED if worse

## 2020-06-26 NOTE — DISCHARGE INSTRUCTIONS
Patient Education        Acute Alcohol Intoxication: Care Instructions  Your Care Instructions     You have had treatment to help your body rid itself of alcohol. Too much alcohol upsets the body's fluid balance. Your doctor may have given you fluids and vitamins. For some people, drinking too much alcohol is a one-time event. For others, it is an ongoing problem. In either case, it is serious. It can be life-threatening. Follow-up care is a key part of your treatment and safety. Be sure to make and go to all appointments, and call your doctor if you are having problems. It's also a good idea to know your test results and keep a list of the medicines you take. How can you care for yourself at home? · Do not drink and drive. · Be safe with medicines. Take your medicines exactly as prescribed. Call your doctor if you think you are having a problem with your medicine. · Your doctor may have prescribed disulfiram (Antabuse). Do not drink any alcohol while you are taking this medicine. You may have severe or even life-threatening side effects from even small amounts of alcohol. · If you were given medicine to prevent nausea, be sure to take it exactly as prescribed. · Before you take any medicine, tell your doctor if:  ? You have had a bad reaction to any medicines in the past.  ? You are taking other medicines, including over-the-counter ones, or have other health problems. ? You are or could be pregnant. · Be prepared to have some symptoms of withdrawal in the next few days. · Drink plenty of liquids in the next few days. · Seek help if you need it to stop drinking. Getting counseling and joining a support group can help you stay sober. Try a support group such as Alcoholics Anonymous. · Avoid alcohol when you take medicines. It can react with many medicines and cause serious problems. When should you call for help? [id number] anytime you think you may need emergency care.  For example, call if:  · You feel confused and are seeing things that are not there. · You are thinking about killing yourself or hurting others. · You have a seizure. · You vomit blood or what looks like coffee grounds. Call your doctor now or seek immediate medical care if:  · You have trembling, restlessness, sweating, and other withdrawal symptoms that are new or that get worse. · Your withdrawal symptoms come back after not bothering you for days or weeks. · You can't stop vomiting. Watch closely for changes in your health, and be sure to contact your doctor if:  · You need help to stop drinking. Where can you learn more? Go to http://adria-kelby.info/  Enter T102 in the search box to learn more about \"Acute Alcohol Intoxication: Care Instructions. \"  Current as of: August 22, 2019               Content Version: 12.5  © 2829-0441 [employer]. Care instructions adapted under license by [employer] (which disclaims liability or warranty for this information). If you have questions about a medical condition or this instruction, always ask your healthcare professional. [address] any warranty or liability for your use of this information.

## 2020-06-26 NOTE — ED TRIAGE NOTES
Picked up by Kian Wang in a parking lot for ETOH intoxication. Blew a .272 and was given option of coming to hospital for help or alf.   Denies any complaints other than alcohol

## 2020-06-26 NOTE — ED NOTES
Pt presents to ED via EMS complaining of alcohol problem. Per EMS,  police found pt asleep in a parking lot and he blew a 0.272 when they woke him up. Police called EMS to transport to the hospital. Pt denies complaints. Per MD, once pt eats, drinks, and is able to ambulate independently with steady gait pt can be discharged. Pt reports he has someone he can call to pick him up. Pt given sandwich, snacks, and ginger ale. Pt is alert and oriented x 4, RR even and unlabored, skin is warm and dry. Assessment completed and pt updated on plan of care. Call bell in reach. Emergency Department Nursing Plan of Care       The Nursing Plan of Care is developed from the Nursing assessment and Emergency Department Attending provider initial evaluation. The plan of care may be reviewed in the ED Provider note.     The Plan of Care was developed with the following considerations:   Patient / Family readiness to learn indicated by:verbalized understanding  Persons(s) to be included in education: patient  Barriers to Learning/Limitations:No    Signed     Roxana Cap    6/25/2020   8:25 PM

## 2020-06-26 NOTE — ED NOTES
Discharge instructions were given to the patient by Anaid Israel RN. The patient left the Emergency Department ambulatory, alert and oriented and in no acute distress with no prescriptions. The patient was encouraged to call or return to the ED for worsening issues or problems and was encouraged to schedule a follow up appointment for continuing care. The patient verbalized understanding of discharge instructions and prescriptions, all questions were answered. The patient has no further concerns at this time.

## 2021-06-04 ENCOUNTER — OFFICE VISIT (OUTPATIENT)
Dept: INTERNAL MEDICINE CLINIC | Age: 51
End: 2021-06-04
Payer: MEDICAID

## 2021-06-04 VITALS
HEART RATE: 90 BPM | RESPIRATION RATE: 18 BRPM | WEIGHT: 211 LBS | DIASTOLIC BLOOD PRESSURE: 82 MMHG | BODY MASS INDEX: 27.08 KG/M2 | HEIGHT: 74 IN | OXYGEN SATURATION: 97 % | SYSTOLIC BLOOD PRESSURE: 124 MMHG | TEMPERATURE: 99 F

## 2021-06-04 DIAGNOSIS — Z11.59 NEED FOR HEPATITIS C SCREENING TEST: ICD-10-CM

## 2021-06-04 DIAGNOSIS — Z00.00 ANNUAL PHYSICAL EXAM: Primary | ICD-10-CM

## 2021-06-04 LAB
ALBUMIN SERPL-MCNC: 4.7 G/DL (ref 3.5–5)
ALBUMIN/GLOB SERPL: 1.5 {RATIO} (ref 1.1–2.2)
ALP SERPL-CCNC: 48 U/L (ref 45–117)
ALT SERPL-CCNC: 61 U/L (ref 12–78)
ANION GAP SERPL CALC-SCNC: 2 MMOL/L (ref 5–15)
APPEARANCE UR: CLEAR
AST SERPL-CCNC: 27 U/L (ref 15–37)
BASOPHILS # BLD: 0.1 K/UL (ref 0–0.1)
BASOPHILS NFR BLD: 1 % (ref 0–1)
BILIRUB SERPL-MCNC: 1.2 MG/DL (ref 0.2–1)
BILIRUB UR QL: NEGATIVE
BUN SERPL-MCNC: 24 MG/DL (ref 6–20)
BUN/CREAT SERPL: 19 (ref 12–20)
CALCIUM SERPL-MCNC: 9.6 MG/DL (ref 8.5–10.1)
CHLORIDE SERPL-SCNC: 108 MMOL/L (ref 97–108)
CHOLEST SERPL-MCNC: 233 MG/DL
CO2 SERPL-SCNC: 30 MMOL/L (ref 21–32)
COLOR UR: NORMAL
CREAT SERPL-MCNC: 1.27 MG/DL (ref 0.7–1.3)
DIFFERENTIAL METHOD BLD: NORMAL
EOSINOPHIL # BLD: 0.3 K/UL (ref 0–0.4)
EOSINOPHIL NFR BLD: 5 % (ref 0–7)
ERYTHROCYTE [DISTWIDTH] IN BLOOD BY AUTOMATED COUNT: 13.2 % (ref 11.5–14.5)
GLOBULIN SER CALC-MCNC: 3.2 G/DL (ref 2–4)
GLUCOSE SERPL-MCNC: 94 MG/DL (ref 65–100)
GLUCOSE UR STRIP.AUTO-MCNC: NEGATIVE MG/DL
HCT VFR BLD AUTO: 46.4 % (ref 36.6–50.3)
HCV AB SERPL QL IA: NONREACTIVE
HCV COMMENT,HCGAC: NORMAL
HDLC SERPL-MCNC: 47 MG/DL
HDLC SERPL: 5 {RATIO} (ref 0–5)
HGB BLD-MCNC: 14.7 G/DL (ref 12.1–17)
HGB UR QL STRIP: NEGATIVE
IMM GRANULOCYTES # BLD AUTO: 0 K/UL (ref 0–0.04)
IMM GRANULOCYTES NFR BLD AUTO: 0 % (ref 0–0.5)
KETONES UR QL STRIP.AUTO: NEGATIVE MG/DL
LDLC SERPL CALC-MCNC: 157.6 MG/DL (ref 0–100)
LEUKOCYTE ESTERASE UR QL STRIP.AUTO: NEGATIVE
LYMPHOCYTES # BLD: 2.1 K/UL (ref 0.8–3.5)
LYMPHOCYTES NFR BLD: 32 % (ref 12–49)
MCH RBC QN AUTO: 28.8 PG (ref 26–34)
MCHC RBC AUTO-ENTMCNC: 31.7 G/DL (ref 30–36.5)
MCV RBC AUTO: 90.8 FL (ref 80–99)
MONOCYTES # BLD: 0.6 K/UL (ref 0–1)
MONOCYTES NFR BLD: 8 % (ref 5–13)
NEUTS SEG # BLD: 3.7 K/UL (ref 1.8–8)
NEUTS SEG NFR BLD: 54 % (ref 32–75)
NITRITE UR QL STRIP.AUTO: NEGATIVE
NRBC # BLD: 0 K/UL (ref 0–0.01)
NRBC BLD-RTO: 0 PER 100 WBC
PH UR STRIP: 5.5 [PH] (ref 5–8)
PLATELET # BLD AUTO: 277 K/UL (ref 150–400)
PMV BLD AUTO: 11.4 FL (ref 8.9–12.9)
POTASSIUM SERPL-SCNC: 4.6 MMOL/L (ref 3.5–5.1)
PROT SERPL-MCNC: 7.9 G/DL (ref 6.4–8.2)
PROT UR STRIP-MCNC: NEGATIVE MG/DL
PSA SERPL-MCNC: 0.6 NG/ML (ref 0.01–4)
RBC # BLD AUTO: 5.11 M/UL (ref 4.1–5.7)
SODIUM SERPL-SCNC: 140 MMOL/L (ref 136–145)
SP GR UR REFRACTOMETRY: 1.02 (ref 1–1.03)
TRIGL SERPL-MCNC: 142 MG/DL (ref ?–150)
UROBILINOGEN UR QL STRIP.AUTO: 0.2 EU/DL (ref 0.2–1)
VLDLC SERPL CALC-MCNC: 28.4 MG/DL
WBC # BLD AUTO: 6.8 K/UL (ref 4.1–11.1)

## 2021-06-04 PROCEDURE — 99396 PREV VISIT EST AGE 40-64: CPT | Performed by: INTERNAL MEDICINE

## 2021-06-04 RX ORDER — PREDNISONE 10 MG/1
10 TABLET ORAL SEE ADMIN INSTRUCTIONS
Qty: 21 TABLET | Refills: 0 | Status: SHIPPED | OUTPATIENT
Start: 2021-06-04 | End: 2021-10-11 | Stop reason: ALTCHOICE

## 2021-06-04 NOTE — PROGRESS NOTES
This note will not be viewable in [address] Winifred Garza II is a 53 y.o. male and presents with Annual Exam (pt states he has been having some issues with his mesh that he had repaired)  . Subjective:    Mr. Kirsten Batista presents today for a complete physical exam.  He is doing well overall. He does have some minor discomfort in his right groin area status post previous hernia repair well over 10 years ago. He has a mesh in place and thinks that he has some scarring there that is causing some discomfort for him. He also has some low back discomfort and has seen Dr. Cherylene Frank previously for epidural steroid injections and a nerve block. This is only been temporarily helpful for him. He states that he will take a steroid Dosepak maybe twice a year for this with good results. He has been doing stretching and conditioning exercises for his back as well. He has lost a few pounds in the past few weeks noting that his blood pressure had been elevated at one point but he is followed a low-sodium diet and his blood pressure is now well controlled. He is not on medication for this currently. Review of Systems  Constitutional: negative for fevers, chills, anorexia and weight loss  Eyes:   negative for visual disturbance and irritation  ENT:   negative for tinnitus,sore throat,nasal congestion,ear pains. hoarseness  Respiratory:  negative for cough, hemoptysis, dyspnea,wheezing  CV:   negative for chest pain, palpitations, lower extremity edema  GI:   negative for nausea, vomiting, diarrhea, abdominal pain,melena  Endo:               negative for polyuria,polydipsia,polyphagia,heat intolerance  Genitourinary: negative for frequency, dysuria and hematuria  Integumentary: negative for rash and pruritus  Hematologic:  negative for easy bruising and gum/nose bleeding  Musculoskel: negative for myalgias, arthralgias,  muscle weakness, joint pain  Neurological:  negative for headaches, dizziness, vertigo, memory problems and gait   Behavl/Psych: negative for feelings of anxiety, depression, mood changes    Past Medical History:   Diagnosis Date    Alcohol abuse 10/2/2017    Annual physical exam 10/2/2017    Anxiety     Back pain 10/2/2017    Basal cell carcinoma 10/2/2017    Depression, acute 10/2/2017    ISABEL (generalized anxiety disorder) 10/2/2017    Mood disorder ([address]     on topiramate, hx of alcoholism    Right anterior shoulder pain 10/2/2017    Right cervical radiculopathy 10/2/2017    Sleep disorder 10/2/2017    Torn meniscus 10/2/2017    Varicocele 10/2/2017     Past Surgical History:   Procedure Laterality Date    HX HERNIA REPAIR  2006   Lucia Flight    Dr. Scott Arita [facility]  2004    Disc Fusion C5-C6 (Dr. Carlos Agarwal, [facility])     Social History     Socioeconomic History    Marital status: SINGLE     Spouse name: Not on file    Number of children: Not on file    Years of education: Not on file    Highest education level: Not on file   Tobacco Use    Smoking status: Never Smoker    Smokeless tobacco: Current User   Vaping Use    Vaping Use: Never used   Substance and Sexual Activity    Alcohol use: Yes     Comment: rarely per pt (hx of ETOH abuse)    Drug use: No     Social Determinants of Health     Financial Resource Strain:     Difficulty of Paying Living Expenses:    Food Insecurity:     Worried About Running Out of Food in the Last Year:     [address] in the Last Year:    Transportation Needs:     Lack of Transportation (Medical):      Lack of Transportation (Non-Medical):    Physical Activity:     Days of Exercise per Week:     Minutes of Exercise per Session:    Stress:     Feeling of Stress :    Social Connections:     Frequency of Communication with Friends and Family:     Frequency of Social Gatherings with Friends and Family:     Attends Mu-ism Services:     Active Member of Clubs or Organizations:     Attends Club or Organization Meetings:  Marital Status:      Family History   Problem Relation Age of Onset    Heart Disease Father     Hypertension Father     Diabetes Maternal Uncle     Cancer Maternal Uncle     Heart Disease Maternal Grandmother     Hypertension Maternal Grandmother     Heart Disease Maternal Grandfather     Hypertension Maternal Grandfather     Heart Disease Paternal Grandmother     Hypertension Paternal Grandmother     Cancer Paternal Grandmother         lung    Heart Disease Paternal Grandfather     Hypertension Paternal Grandfather        No Known Allergies    Objective:  Visit Vitals  /82 (BP 1 Location: Left arm, BP Patient Position: Sitting, BP Cuff Size: Large adult)   Pulse 90   Temp 99 °F (37.2 °C) (Oral)   Resp 18   Ht 6' 2\" (1.88 m)   Wt 211 lb (95.7 kg)   SpO2 97%   BMI 27.09 kg/m²     Physical Exam:   General appearance - alert, well appearing, and in no distress  Mental status - alert, oriented to person, place, and time  EYE-ARTURO, EOMI, fundi normal, corneas normal, no foreign bodies  ENT-ENT exam normal, no neck nodes or sinus tenderness  Nose - normal and patent, no erythema, discharge or polyps  Mouth - mucous membranes moist, pharynx normal without lesions  Neck - supple, no significant adenopathy   Chest - clear to auscultation, no wheezes, rales or rhonchi, symmetric air entry   Heart - normal rate, regular rhythm, normal S1, S2, no murmurs, rubs, clicks or gallops   Abdomen - soft, nontender, nondistended, no masses or organomegaly  Lymph- no adenopathy palpable  Ext-peripheral pulses normal, no pedal edema, no clubbing or cyanosis  Skin-Warm and dry. no hyperpigmentation, vitiligo, or suspicious lesions  Neuro -alert, oriented, normal speech, no focal findings or movement disorder noted  Musculoskeletal- FROM, no bony abnormalities, no point tenderness   -deferred    No results found for this visit on 06/04/21.     Assessment/Plan:    Orders Placed This Encounter    CBC WITH AUTOMATED DIFF     Standing Status:   Future     Standing Expiration Date:   6/4/2022    LIPID PANEL     Standing Status:   Future     Standing Expiration Date:   6/8/3179    METABOLIC PANEL, COMPREHENSIVE     Standing Status:   Future     Standing Expiration Date:   6/4/2022    URINALYSIS W/ RFLX MICROSCOPIC     Standing Status:   Future     Standing Expiration Date:   6/4/2022    PSA SCREENING (SCREENING)     Standing Status:   Future     Standing Expiration Date:   6/4/2022    HEPATITIS C AB     Standing Status:   Future     Standing Expiration Date:   6/4/2022    [address]     Referral Priority:   Routine     Referral Type:   Consultation     Referral Reason:   Specialty Services Required     Referral Location:   [facility]     Referred to Provider:   Elinor Serrato MD     Number of Visits Requested:   1    predniSONE (STERAPRED DS) 10 mg dose pack     Sig: Take 1 Tablet by mouth See Admin Instructions. See administration instruction per 10mg dose pack     Dispense:  21 Tablet     Refill:  0       Problem List Items Addressed This Visit     None      Visit Diagnoses     Annual physical exam    -  Primary    Relevant Orders    CBC WITH AUTOMATED DIFF    LIPID PANEL    METABOLIC PANEL, COMPREHENSIVE    URINALYSIS W/ RFLX MICROSCOPIC    PSA SCREENING (SCREENING)    REFERRAL TO GASTROENTEROLOGY    Need for hepatitis C screening test        Relevant Orders    HEPATITIS C AB      Plan:    Normal routine health maintenance exam.  Refer for screening colonoscopy. Further recommendations based on labs as ordered. He will continue back exercises and conditioning for chronic low back pain. There are no Patient Instructions on file for this visit. Follow-up and Dispositions    · Return in about 1 year (around 6/4/2022). I have reviewed with the patient details of the assessment and plan and all questions were answered. Relevent patient education was performed. The most recent lab findings were reviewed with the patient. An After Visit Summary was printed and given to the patient.       Daisy Cook MD

## 2021-06-04 NOTE — PROGRESS NOTES
1. Have you been to the ER, urgent care clinic since your last visit? Hospitalized since your last visit? No    2. Have you seen or consulted any other health care providers outside of the [address] [place] [address] since your last visit? Include any pap smears or colon screening.  No

## 2021-07-27 ENCOUNTER — TELEPHONE (OUTPATIENT)
Dept: INTERNAL MEDICINE CLINIC | Age: 51
End: 2021-07-27

## 2021-07-27 NOTE — TELEPHONE ENCOUNTER
Patient calling stating he would like to speak with Dr. Fabian Wallace about the covid vaccine. Him & his daughter are going on a trip to [place] on Aug 21 - 28 and is concerned because they have not had the vaccine and wanted Dr. Christophe Sinha opinion?

## 2021-10-11 ENCOUNTER — OFFICE VISIT (OUTPATIENT)
Dept: INTERNAL MEDICINE CLINIC | Age: 51
End: 2021-10-11
Payer: MEDICAID

## 2021-10-11 VITALS
HEART RATE: 77 BPM | WEIGHT: 204 LBS | SYSTOLIC BLOOD PRESSURE: 128 MMHG | TEMPERATURE: 98.7 F | DIASTOLIC BLOOD PRESSURE: 88 MMHG | OXYGEN SATURATION: 97 % | BODY MASS INDEX: 26.18 KG/M2 | HEIGHT: 74 IN | RESPIRATION RATE: 14 BRPM

## 2021-10-11 DIAGNOSIS — R00.2 PALPITATIONS: Primary | ICD-10-CM

## 2021-10-11 DIAGNOSIS — F41.9 ANXIETY: ICD-10-CM

## 2021-10-11 PROCEDURE — 99213 OFFICE O/P EST LOW 20 MIN: CPT | Performed by: INTERNAL MEDICINE

## 2021-10-11 RX ORDER — SERTRALINE HYDROCHLORIDE 50 MG/1
50 TABLET, FILM COATED ORAL DAILY
Qty: 30 TABLET | Refills: 5 | Status: SHIPPED | OUTPATIENT
Start: 2021-10-11 | End: 2022-02-04 | Stop reason: SDUPTHER

## 2021-10-11 RX ORDER — ALPRAZOLAM 0.25 MG/1
0.25 TABLET ORAL
Qty: 30 TABLET | Refills: 0 | Status: SHIPPED | OUTPATIENT
Start: 2021-10-11 | End: 2022-01-06 | Stop reason: SDUPTHER

## 2021-10-11 NOTE — PROGRESS NOTES
Rena Levin II is a 49 y.o. male and presents with Anxiety  . Subjective:  Mr. Franko Chaudhary presents today with complaint of palpitations. He states this is occurring later in the day. Is been more of a recent development. He attributes this to a lot of stress and family dynamics. He has been having a lot of ruminating thoughts at nighttime which has interfered with his sleeping. He sometimes has difficulty getting to sleep and sometimes awakens early with racing thoughts. He is not taking any over-the-counter medications for this. Past Medical History:   Diagnosis Date    Alcohol abuse 10/2/2017    Annual physical exam 10/2/2017    Anxiety     Back pain 10/2/2017    Basal cell carcinoma 10/2/2017    Depression, acute 10/2/2017    ISABEL (generalized anxiety disorder) 10/2/2017    Mood disorder ([address]     on topiramate, hx of alcoholism    Right anterior shoulder pain 10/2/2017    Right cervical radiculopathy 10/2/2017    Sleep disorder 10/2/2017    Torn meniscus 10/2/2017    Varicocele 10/2/2017     Past Surgical History:   Procedure Laterality Date    HX HERNIA REPAIR  2006   Saintclair Hopper    Dr. Oseguera Osito [facility]  2004    Disc Fusion C5-C6 (Dr. Luis Fernando Florian, [facility])     No Known Allergies  Current Outpatient Medications   Medication Sig Dispense Refill    ALPRAZolam (XANAX) 0.25 mg tablet Take 1 Tablet by mouth three (3) times daily as needed for Anxiety. Max Daily Amount: 0.75 mg. 30 Tablet 0    sertraline (ZOLOFT) 50 mg tablet Take 1 Tablet by mouth daily.  30 Tablet 5     Social History     Socioeconomic History    Marital status: SINGLE     Spouse name: Not on file    Number of children: Not on file    Years of education: Not on file    Highest education level: Not on file   Tobacco Use    Smoking status: Never Smoker    Smokeless tobacco: Current User   Vaping Use    Vaping Use: Never used   Substance and Sexual Activity    Alcohol use: Yes     Comment: rarely per pt (hx of ETOH abuse)    Drug use: No     Social Determinants of Health     Financial Resource Strain:     Difficulty of Paying Living Expenses:    Food Insecurity:     Worried About Running Out of Food in the Last Year:     [address] in the Last Year:    Transportation Needs:     Lack of Transportation (Medical):  Lack of Transportation (Non-Medical):    Physical Activity:     Days of Exercise per Week:     Minutes of Exercise per Session:    Stress:     Feeling of Stress :    Social Connections:     Frequency of Communication with Friends and Family:     Frequency of Social Gatherings with Friends and Family:     Attends Yazidism Services:     Active Member of Clubs or Organizations:     Attends Club or Organization Meetings:     Marital Status:      Family History   Problem Relation Age of Onset    Heart Disease Father     Hypertension Father     Diabetes Maternal Uncle     Cancer Maternal Uncle     Heart Disease Maternal Grandmother     Hypertension Maternal Grandmother     Heart Disease Maternal Grandfather     Hypertension Maternal Grandfather     Heart Disease Paternal Grandmother     Hypertension Paternal Grandmother     Cancer Paternal Grandmother         lung    Heart Disease Paternal Grandfather     Hypertension Paternal Grandfather        Review of Systems  Constitutional:  negative for fevers, chills, anorexia and weight loss  Eyes:    negative for visual disturbance and irritation  ENT:    negative for tinnitus,sore throat,nasal congestion,ear pains. hoarseness  Respiratory:     negative for cough, hemoptysis, dyspnea,wheezing  CV:    negative for chest pain, lower extremity edema  GI:    negative for nausea, vomiting, diarrhea, abdominal pain,melena  Endo:               negative for polyuria,polydipsia,polyphagia,heat intolerance  Genitourinary : negative for frequency, dysuria and hematuria  Integumentary: negative for rash and pruritus  Hematologic: negative for easy bruising and gum/nose bleeding  Musculoskel:  negative for myalgias, arthralgias, back pain, muscle weakness, joint pain  Neurological:   negative for headaches, dizziness, vertigo, memory problems and gait   Behavl/Psych:  negative for feelings of  depression, mood changes  ROS otherwise negative      Objective:  Visit Vitals  /88 (BP 1 Location: Left upper arm, BP Patient Position: Sitting, BP Cuff Size: Large adult)   Pulse 77   Temp 98.7 °F (37.1 °C) (Oral)   Resp 14   Ht 6' 2\" (1.88 m)   Wt 204 lb (92.5 kg)   SpO2 97%   BMI 26.19 kg/m²     Physical Exam:   General appearance - alert, well appearing, and in no distress  Mental status - alert, oriented to person, place, and time  EYE-ARTURO, EOMI, fundi normal, corneas normal, no foreign bodies  ENT-ENT exam normal, no neck nodes or sinus tenderness  Nose - normal and patent, no erythema, discharge or polyps  Mouth - mucous membranes moist, pharynx normal without lesions  Neck - supple, no significant adenopathy   Chest - clear to auscultation, no wheezes, rales or rhonchi, symmetric air entry   Heart - normal rate, regular rhythm, normal S1, S2, no murmurs, rubs, clicks or gallops   Abdomen - soft, nontender, nondistended, no masses or organomegaly  Lymph- no adenopathy palpable  Ext-peripheral pulses normal, no pedal edema, no clubbing or cyanosis  Skin-Warm and dry. no hyperpigmentation, vitiligo, or suspicious lesions  Neuro -alert, oriented, normal speech, no focal findings or movement disorder noted      Assessment/Plan:  Diagnoses and all orders for this visit:    1. Palpitations    2. Anxiety  -     ALPRAZolam (XANAX) 0.25 mg tablet; Take 1 Tablet by mouth three (3) times daily as needed for Anxiety. Max Daily Amount: 0.75 mg. Other orders  -     sertraline (ZOLOFT) 50 mg tablet; Take 1 Tablet by mouth daily. ICD-10-CM ICD-9-CM    1. Palpitations  R00.2 785.1    2.  Anxiety  F41.9 300.00 ALPRAZolam (XANAX) 0.25 mg tablet Plan:    Start alprazolam 3 times daily as needed for anxiety. Start sertraline 50 mg daily. If he does not see an adequate response within a month we will adjust his dose from 50 to 100 mg as discussed. Follow-up otherwise if symptoms do not improve. I have reviewed with the patient details of the assessment and plan and all questions were answered. Relevent patient education was performed. Verbal and/or written instructions (see AVS) provided. The most recent lab findings were reviewed with the patient. Plan was discussed with patient who verbally expressed understanding. An After Visit Summary was printed and given to the patient.     Sherlie Saint, MD

## 2021-10-11 NOTE — PROGRESS NOTES
Kory Urias II presents today at the clinic for    Chief Complaint   Patient presents with   [facility] Anxiety        Wt Readings from Last 3 Encounters:   10/11/21 204 lb (92.5 kg)   06/04/21 211 lb (95.7 kg)   06/25/20 220 lb (99.8 kg)     Temp Readings from Last 3 Encounters:   10/11/21 98.7 °F (37.1 °C) (Oral)   06/04/21 99 °F (37.2 °C) (Oral)   06/25/20 98.1 °F (36.7 °C)     BP Readings from Last 3 Encounters:   10/11/21 128/88   06/04/21 124/82   06/25/20 118/86     Pulse Readings from Last 3 Encounters:   10/11/21 77   06/04/21 90   06/25/20 (!) 111       Health Maintenance Due   Topic    Pneumococcal 0-64 years (1 of 2 - PPSV23)    COVID-19 Vaccine (1)    DTaP/Tdap/Td series (1 - Tdap)    Colorectal Cancer Screening Combo     Shingrix Vaccine Age 50> (1 of 2)    Flu Vaccine (1)         Learning Assessment:  :     Learning Assessment 2/1/2016 8/4/2014   PRIMARY LEARNER Patient Patient   PRIMARY LANGUAGE ENGLISH ENGLISH   LEARNER PREFERENCE PRIMARY LISTENING LISTENING   ANSWERED BY patient patient   RELATIONSHIP SELF SELF           1. Have you been to the ER, urgent care clinic since your last visit? Hospitalized since your last visit? no    2. Have you seen or consulted any other health care providers outside of the [address] [place] [address] since your last visit? Include any pap smears or colon screening.  no

## 2022-01-06 DIAGNOSIS — F41.9 ANXIETY: ICD-10-CM

## 2022-01-06 RX ORDER — ALPRAZOLAM 0.25 MG/1
0.25 TABLET ORAL
Qty: 30 TABLET | Refills: 0 | Status: SHIPPED | OUTPATIENT
Start: 2022-01-06 | End: 2022-02-04 | Stop reason: SDUPTHER

## 2022-01-06 NOTE — TELEPHONE ENCOUNTER
Last Refill: 6/4/21  Last Visit: 10/11/2021   Next Visit: 6/8/2022    Requested Prescriptions     Pending Prescriptions Disp Refills    ALPRAZolam (XANAX) 0.25 mg tablet 30 Tablet 0     Sig: Take 1 Tablet by mouth three (3) times daily as needed for Anxiety. Max Daily Amount: 0.75 mg.

## 2022-02-04 DIAGNOSIS — F41.9 ANXIETY: ICD-10-CM

## 2022-02-04 RX ORDER — SERTRALINE HYDROCHLORIDE 50 MG/1
50 TABLET, FILM COATED ORAL DAILY
Qty: 30 TABLET | Refills: 5 | Status: SHIPPED | OUTPATIENT
Start: 2022-02-04 | End: 2022-03-08 | Stop reason: SDUPTHER

## 2022-02-04 RX ORDER — ALPRAZOLAM 0.25 MG/1
0.25 TABLET ORAL
Qty: 30 TABLET | Refills: 0 | Status: SHIPPED | OUTPATIENT
Start: 2022-02-04 | End: 2022-02-25 | Stop reason: SDUPTHER

## 2022-02-04 NOTE — TELEPHONE ENCOUNTER
PCP: Jeanette Oswald MD    Last appt: 10/11/2021  Future Appointments   Date Time Provider Eun Davila   6/8/2022  9:30 AM Jeanette Oswald MD [facility] BS AMB       Requested Prescriptions     Pending Prescriptions Disp Refills    sertraline (ZOLOFT) 50 mg tablet 30 Tablet 5     Sig: Take 1 Tablet by mouth daily.  ALPRAZolam (XANAX) 0.25 mg tablet 30 Tablet 0     Sig: Take 1 Tablet by mouth three (3) times daily as needed for Anxiety.  Max Daily Amount: 0.75 mg.       Prior labs and Blood pressures:  BP Readings from Last 3 Encounters:   10/11/21 128/88   06/04/21 124/82   06/25/20 118/86     Lab Results   Component Value Date/Time    Sodium 140 06/04/2021 10:25 AM    Potassium 4.6 06/04/2021 10:25 AM    Chloride 108 06/04/2021 10:25 AM    CO2 30 06/04/2021 10:25 AM    Anion gap 2 (L) 06/04/2021 10:25 AM    Glucose 94 06/04/2021 10:25 AM    BUN 24 (H) 06/04/2021 10:25 AM    Creatinine 1.27 06/04/2021 10:25 AM    BUN/Creatinine ratio 19 06/04/2021 10:25 AM    GFR est AA >60 06/04/2021 10:25 AM    GFR est non-AA >60 06/04/2021 10:25 AM    Calcium 9.6 06/04/2021 10:25 AM     Lab Results   Component Value Date/Time    Hemoglobin A1c 6.0 09/27/2017 10:24 AM     Lab Results   Component Value Date/Time    Cholesterol, total 233 (H) 06/04/2021 10:25 AM    HDL Cholesterol 47 06/04/2021 10:25 AM    LDL, calculated 157.6 (H) 06/04/2021 10:25 AM    VLDL, calculated 28.4 06/04/2021 10:25 AM    Triglyceride 142 06/04/2021 10:25 AM    CHOL/HDL Ratio 5.0 06/04/2021 10:25 AM     Lab Results   Component Value Date/Time    Vitamin D 25-Hydroxy 31.6 09/27/2017 10:24 AM       Lab Results   Component Value Date/Time    TSH 2.05 02/22/2016 04:20 PM

## 2022-02-25 DIAGNOSIS — F41.9 ANXIETY: ICD-10-CM

## 2022-02-25 RX ORDER — ALPRAZOLAM 0.25 MG/1
0.25 TABLET ORAL
Qty: 30 TABLET | Refills: 0 | Status: SHIPPED | OUTPATIENT
Start: 2022-02-25 | End: 2022-03-23 | Stop reason: SDUPTHER

## 2022-02-25 NOTE — TELEPHONE ENCOUNTER
PCP: Mj Singh MD    Last appt: 10/11/2021  Future Appointments   Date Time Provider Eun Davila   6/8/2022  9:30 AM Mj Singh MD [facility] AMB       Requested Prescriptions     Pending Prescriptions Disp Refills    ALPRAZolam (XANAX) 0.25 mg tablet 30 Tablet 0     Sig: Take 1 Tablet by mouth three (3) times daily as needed for Anxiety. Max Daily Amount: 0.75 mg.          Other Comments:

## 2022-03-08 NOTE — TELEPHONE ENCOUNTER
PCP: Sonia Schilling MD    Last appt: 10/11/2021  Future Appointments   Date Time Provider Eun Davila   6/8/2022  9:30 AM Snoia Schilling MD [facility] AMB       Requested Prescriptions     Pending Prescriptions Disp Refills    sertraline (ZOLOFT) 50 mg tablet 30 Tablet 5     Sig: Take 1 Tablet by mouth daily.        Prior labs and Blood pressures:  BP Readings from Last 3 Encounters:   10/11/21 128/88   06/04/21 124/82   06/25/20 118/86     Lab Results   Component Value Date/Time    Sodium 140 06/04/2021 10:25 AM    Potassium 4.6 06/04/2021 10:25 AM    Chloride 108 06/04/2021 10:25 AM    CO2 30 06/04/2021 10:25 AM    Anion gap 2 (L) 06/04/2021 10:25 AM    Glucose 94 06/04/2021 10:25 AM    BUN 24 (H) 06/04/2021 10:25 AM    Creatinine 1.27 06/04/2021 10:25 AM    BUN/Creatinine ratio 19 06/04/2021 10:25 AM    GFR est AA >60 06/04/2021 10:25 AM    GFR est non-AA >60 06/04/2021 10:25 AM    Calcium 9.6 06/04/2021 10:25 AM     Lab Results   Component Value Date/Time    Hemoglobin A1c 6.0 09/27/2017 10:24 AM     Lab Results   Component Value Date/Time    Cholesterol, total 233 (H) 06/04/2021 10:25 AM    HDL Cholesterol 47 06/04/2021 10:25 AM    LDL, calculated 157.6 (H) 06/04/2021 10:25 AM    VLDL, calculated 28.4 06/04/2021 10:25 AM    Triglyceride 142 06/04/2021 10:25 AM    CHOL/HDL Ratio 5.0 06/04/2021 10:25 AM     Lab Results   Component Value Date/Time    Vitamin D 25-Hydroxy 31.6 09/27/2017 10:24 AM       Lab Results   Component Value Date/Time    TSH 2.05 02/22/2016 04:20 PM

## 2022-03-09 RX ORDER — SERTRALINE HYDROCHLORIDE 50 MG/1
50 TABLET, FILM COATED ORAL DAILY
Qty: 30 TABLET | Refills: 5 | Status: SHIPPED | OUTPATIENT
Start: 2022-03-09 | End: 2022-06-03 | Stop reason: SDUPTHER

## 2022-03-18 PROBLEM — F10.10 ALCOHOL ABUSE: Status: ACTIVE | Noted: 2017-10-02

## 2022-03-18 PROBLEM — G47.9 SLEEP DISORDER: Status: ACTIVE | Noted: 2017-10-02

## 2022-03-18 PROBLEM — M25.511 RIGHT ANTERIOR SHOULDER PAIN: Status: ACTIVE | Noted: 2017-10-02

## 2022-03-19 PROBLEM — M54.9 BACK PAIN: Status: ACTIVE | Noted: 2017-10-02

## 2022-03-19 PROBLEM — C44.91 BASAL CELL CARCINOMA: Status: ACTIVE | Noted: 2017-10-02

## 2022-03-19 PROBLEM — M50.20 HNP (HERNIATED NUCLEUS PULPOSUS), CERVICAL: Status: ACTIVE | Noted: 2017-10-10

## 2022-03-19 PROBLEM — F41.1 GAD (GENERALIZED ANXIETY DISORDER): Status: ACTIVE | Noted: 2017-10-02

## 2022-03-19 PROBLEM — F32.A DEPRESSION, ACUTE: Status: ACTIVE | Noted: 2017-10-02

## 2022-03-19 PROBLEM — I86.1 VARICOCELE: Status: ACTIVE | Noted: 2017-10-02

## 2022-03-19 PROBLEM — M54.12 RIGHT CERVICAL RADICULOPATHY: Status: ACTIVE | Noted: 2017-10-02

## 2022-03-20 PROBLEM — S83.209A TORN MENISCUS: Status: ACTIVE | Noted: 2017-10-02

## 2022-03-23 DIAGNOSIS — F41.9 ANXIETY: ICD-10-CM

## 2022-03-23 NOTE — TELEPHONE ENCOUNTER
PCP: Cy Flaherty MD    Last appt: 10/11/2021  Future Appointments   Date Time Provider Eun Davila   6/8/2022  9:30 AM Cy Flaherty MD [facility] AMB       Requested Prescriptions     Pending Prescriptions Disp Refills    ALPRAZolam (XANAX) 0.25 mg tablet 30 Tablet 0     Sig: Take 1 Tablet by mouth three (3) times daily as needed for Anxiety. Max Daily Amount: 0.75 mg.          Other Comments:

## 2022-03-24 RX ORDER — ALPRAZOLAM 0.25 MG/1
0.25 TABLET ORAL
Qty: 30 TABLET | Refills: 0 | Status: SHIPPED | OUTPATIENT
Start: 2022-03-24 | End: 2022-04-08 | Stop reason: SDUPTHER

## 2022-04-08 DIAGNOSIS — F41.9 ANXIETY: ICD-10-CM

## 2022-04-08 RX ORDER — ALPRAZOLAM 0.25 MG/1
0.25 TABLET ORAL
Qty: 30 TABLET | Refills: 0 | Status: SHIPPED | OUTPATIENT
Start: 2022-04-08 | End: 2022-04-29 | Stop reason: SDUPTHER

## 2022-04-08 NOTE — TELEPHONE ENCOUNTER
Last Refill: 3-24-22  Last Visit: 10/11/2021   Next Visit: 6/8/2022     Requested Prescriptions     Pending Prescriptions Disp Refills    ALPRAZolam (XANAX) 0.25 mg tablet 30 Tablet 0     Sig: Take 1 Tablet by mouth three (3) times daily as needed for Anxiety. Max Daily Amount: 0.75 mg.

## 2022-04-29 DIAGNOSIS — F41.9 ANXIETY: ICD-10-CM

## 2022-04-29 RX ORDER — ALPRAZOLAM 0.25 MG/1
0.25 TABLET ORAL
Qty: 30 TABLET | Refills: 0 | Status: SHIPPED | OUTPATIENT
Start: 2022-04-29 | End: 2022-05-20 | Stop reason: SDUPTHER

## 2022-04-29 NOTE — TELEPHONE ENCOUNTER
Last Refill: 4-08-22  Last Visit: 10-11-21  Next Visit: 6/8/2022     Requested Prescriptions     Pending Prescriptions Disp Refills    ALPRAZolam (XANAX) 0.25 mg tablet 30 Tablet 0     Sig: Take 1 Tablet by mouth three (3) times daily as needed for Anxiety. Max Daily Amount: 0.75 mg.

## 2022-05-20 DIAGNOSIS — F41.9 ANXIETY: ICD-10-CM

## 2022-05-20 RX ORDER — ALPRAZOLAM 0.25 MG/1
0.25 TABLET ORAL
Qty: 30 TABLET | Refills: 0 | Status: SHIPPED | OUTPATIENT
Start: 2022-05-20 | End: 2022-06-03 | Stop reason: SDUPTHER

## 2022-05-20 NOTE — TELEPHONE ENCOUNTER
Last Refill: 4-29-22  Last Visit: 10-11-21  Next Visit: 6/8/2022     Requested Prescriptions     Pending Prescriptions Disp Refills    ALPRAZolam (XANAX) 0.25 mg tablet 30 Tablet 0     Sig: Take 1 Tablet by mouth three (3) times daily as needed for Anxiety. Max Daily Amount: 0.75 mg.

## 2022-06-03 DIAGNOSIS — F41.9 ANXIETY: ICD-10-CM

## 2022-06-03 RX ORDER — ALPRAZOLAM 0.25 MG/1
0.25 TABLET ORAL
Qty: 30 TABLET | Refills: 0 | Status: SHIPPED | OUTPATIENT
Start: 2022-06-03 | End: 2022-06-20 | Stop reason: SDUPTHER

## 2022-06-03 RX ORDER — SERTRALINE HYDROCHLORIDE 50 MG/1
50 TABLET, FILM COATED ORAL DAILY
Qty: 30 TABLET | Refills: 5 | Status: SHIPPED | OUTPATIENT
Start: 2022-06-03 | End: 2022-07-05 | Stop reason: SDUPTHER

## 2022-06-08 ENCOUNTER — OFFICE VISIT (OUTPATIENT)
Dept: INTERNAL MEDICINE CLINIC | Age: 52
End: 2022-06-08
Payer: MEDICAID

## 2022-06-08 VITALS
BODY MASS INDEX: 27.72 KG/M2 | OXYGEN SATURATION: 98 % | DIASTOLIC BLOOD PRESSURE: 80 MMHG | HEIGHT: 74 IN | HEART RATE: 70 BPM | WEIGHT: 216 LBS | RESPIRATION RATE: 18 BRPM | SYSTOLIC BLOOD PRESSURE: 124 MMHG | TEMPERATURE: 98 F

## 2022-06-08 DIAGNOSIS — Z00.00 ANNUAL PHYSICAL EXAM: Primary | ICD-10-CM

## 2022-06-08 DIAGNOSIS — R79.89 LOW TESTOSTERONE: ICD-10-CM

## 2022-06-08 PROCEDURE — 99396 PREV VISIT EST AGE 40-64: CPT | Performed by: INTERNAL MEDICINE

## 2022-06-08 NOTE — PROGRESS NOTES
Jessica Burden II is a 49 y.o. male and presents with Complete Physical  .    Subjective:    Mr. Robin Ocampo presents today for complete physical exam.  He is doing well on his current medical regimen. He denies any shortness of breath, chest pain, palpitations, PND, orthopnea, or pedal edema. He remains on sertraline 50 mg daily and alprazolam as needed. He has a history of alcohol dependence. He has not had any complications related to this. He has not had a colonoscopy. His daughter plays basketball at [employer] and is a tomas. She is playing [employer] ball and having to play basketball in college. Review of Systems  Constitutional: negative for fevers, chills, anorexia and weight loss  Eyes:   negative for visual disturbance and irritation  ENT:   negative for tinnitus,sore throat,nasal congestion,ear pains. hoarseness  Respiratory:  negative for cough, hemoptysis, dyspnea,wheezing  CV:   negative for chest pain, palpitations, lower extremity edema  GI:   negative for nausea, vomiting, diarrhea, abdominal pain,melena  Endo:               negative for polyuria,polydipsia,polyphagia,heat intolerance  Genitourinary: negative for frequency, dysuria and hematuria  Integumentary: negative for rash and pruritus  Hematologic:  negative for easy bruising and gum/nose bleeding  Musculoskel: negative for myalgias, arthralgias, back pain, muscle weakness, joint pain  Neurological:  negative for headaches, dizziness, vertigo, memory problems and gait   Behavl/Psych: negative for feelings of anxiety, depression, mood changes    Past Medical History:   Diagnosis Date    Alcohol abuse 10/2/2017    Annual physical exam 10/2/2017    Anxiety     Back pain 10/2/2017    Basal cell carcinoma 10/2/2017    Depression, acute 10/2/2017    ISABEL (generalized anxiety disorder) 10/2/2017    Mood disorder ([facility] [address]     on topiramate, hx of alcoholism    Right anterior shoulder pain 10/2/2017    Right cervical radiculopathy 10/2/2017    Sleep disorder 10/2/2017    Torn meniscus 10/2/2017    Varicocele 10/2/2017     Past Surgical History:   Procedure Laterality Date    [facility]PA[facility]  [facility]  [facility]    Disc Fusion C5-C6 (Dr. Altagracia Rose, [facility])     Social History     Socioeconomic History    Marital status: SINGLE   Tobacco Use    Smoking status: Never Smoker    Smokeless tobacco: Current User   Vaping Use    Vaping Use: Never used   Substance and Sexual Activity    Alcohol use: Yes     Comment: rarely per pt (hx of ETOH abuse)    Drug use: No     Family History   Problem Relation Age of Onset    Heart Disease Father     Hypertension Father     Diabetes Maternal Uncle     Cancer Maternal Uncle     Heart Disease Maternal Grandmother     Hypertension Maternal Grandmother     Heart Disease Maternal Grandfather     Hypertension Maternal Grandfather     Heart Disease Paternal Grandmother     Hypertension Paternal Grandmother     Cancer Paternal Grandmother         lung    Heart Disease Paternal Grandfather     Hypertension Paternal Grandfather      Current Outpatient Medications   Medication Sig Dispense Refill    sertraline (ZOLOFT) 50 mg tablet Take 1 Tablet by mouth daily. 30 Tablet 5    ALPRAZolam (XANAX) 0.25 mg tablet Take 1 Tablet by mouth three (3) times daily as needed for Anxiety.  Max Daily Amount: 0.75 mg. 30 Tablet 0     No Known Allergies    Objective:  Visit Vitals  /80 (BP 1 Location: Left upper arm, BP Patient Position: Sitting, BP Cuff Size: Adult)   Pulse 70   Temp 98 °F (36.7 °C) (Oral)   Resp 18   Ht 6' 2\" (1.88 m)   Wt 216 lb (98 kg)   SpO2 98%   BMI 27.73 kg/m²     Physical Exam:   General appearance - alert, well appearing, and in no distress  Mental status - alert, oriented to person, place, and time  EYE-ARTURO, EOMI, fundi normal, corneas normal, no foreign bodies  ENT-ENT exam normal, no neck nodes or sinus tenderness  Nose - normal and patent, no erythema, discharge or polyps  Mouth - mucous membranes moist, pharynx normal without lesions  Neck - supple, no significant adenopathy   Chest - clear to auscultation, no wheezes, rales or rhonchi, symmetric air entry   Heart - normal rate, regular rhythm, normal S1, S2, no murmurs, rubs, clicks or gallops   Abdomen - soft, nontender, nondistended, no masses or organomegaly  Lymph- no adenopathy palpable  Ext-peripheral pulses normal, no pedal edema, no clubbing or cyanosis  Skin-Warm and dry. no hyperpigmentation, vitiligo, or suspicious lesions  Neuro -alert, oriented, normal speech, no focal findings or movement disorder noted  Musculoskeletal- FROM, no bony abnormalities, no point tenderness   -deferred    No results found for this visit on 06/08/22.     Assessment/Plan:    Orders Placed This Encounter    CBC WITH AUTOMATED DIFF     Standing Status:   Future     Standing Expiration Date:   6/8/2023    LIPID PANEL     Standing Status:   Future     Standing Expiration Date:   0/8/8247    METABOLIC PANEL, COMPREHENSIVE     Standing Status:   Future     Standing Expiration Date:   6/8/2023    URINALYSIS W/ RFLX MICROSCOPIC     Standing Status:   Future     Standing Expiration Date:   6/8/2023    PSA SCREENING (SCREENING)     Standing Status:   Future     Standing Expiration Date:   6/8/2023    [facility]     Referral Priority:   Routine     Referral Type:   Consultation     Referral Reason:   Specialty Services Required     Referred to Provider:   Jan Romano MD     Requested Specialty:   Gastroenterology     Number of Visits Requested:   1       Problem List Items Addressed This Visit     None      Visit Diagnoses     Annual physical exam    -  Primary    Relevant Orders    CBC WITH AUTOMATED DIFF    LIPID PANEL    METABOLIC PANEL, COMPREHENSIVE    URINALYSIS W/ RFLX MICROSCOPIC    PSA SCREENING (SCREENING)    REFERRAL TO GASTROENTEROLOGY          There are no Patient Instructions on file for this visit. I have reviewed with the patient details of the assessment and plan and all questions were answered. Relevent patient education was performed. The most recent lab findings were reviewed with the patient. An After Visit Summary was printed and given to the patient.       Franklyn Madrigal MD

## 2022-06-08 NOTE — PROGRESS NOTES
Leeann Stovall II is a 49 y.o. male presenting for Complete Physical  .     1. Have you been to the ER, urgent care clinic since your last visit? Hospitalized since your last visit? No    2. Have you seen or consulted any other health care providers outside of the [address] [place] [address] since your last visit? Include any pap smears or colon screening. No    No flowsheet data found. No flowsheet data found. 3 most recent PHQ Screens 6/8/2022   Little interest or pleasure in doing things Not at all   Feeling down, depressed, irritable, or hopeless Not at all   Total Score PHQ 2 0       There are no discontinued medications.

## 2022-06-08 NOTE — PATIENT INSTRUCTIONS
Well Visit, Men 48 to 72: Care Instructions  Overview     Well visits can help you stay healthy. Your doctor has checked your overall health and may have suggested ways to take good care of yourself. Your doctor also may have recommended tests. At home, you can help prevent illness with healthy eating, regular exercise, and other steps. Follow-up care is a key part of your treatment and safety. Be sure to make and go to all appointments, and call your doctor if you are having problems. It's also a good idea to know your test results and keep a list of the medicines you take. How can you care for yourself at home? · Get screening tests that you and your doctor decide on. Screening helps find diseases before any symptoms appear. · Eat healthy foods. Choose fruits, vegetables, whole grains, protein, and low-fat dairy foods. Limit fat, especially saturated fat. Reduce salt in your diet. · Limit alcohol. Have no more than 2 drinks a day or 14 drinks a week. · Get at least 30 minutes of exercise on most days of the week. Walking is a good choice. You also may want to do other activities, such as running, swimming, cycling, or playing tennis or team sports. · Reach and stay at a healthy weight. This will lower your risk for many problems, such as obesity, diabetes, heart disease, and high blood pressure. · Do not smoke. Smoking can make health problems worse. If you need help quitting, talk to your doctor about stop-smoking programs and medicines. These can increase your chances of quitting for good. · Care for your mental health. It is easy to get weighed down by worry and stress. Learn strategies to manage stress, like deep breathing and mindfulness, and stay connected with your family and community. If you find you often feel sad or hopeless, talk with your doctor. Treatment can help. · Talk to your doctor about whether you have any risk factors for sexually transmitted infections (STIs).  You can help prevent STIs if you wait to have sex with a new partner (or partners) until you've each been tested for STIs. It also helps if you use condoms (male or female condoms) and if you limit your sex partners to one person who only has sex with you. Vaccines are available for some STIs. · If it's important to you to prevent pregnancy with your partner, talk with your doctor about birth control options that might be best for you. · If you think you may have a problem with alcohol or drug use, talk to your doctor. This includes prescription medicines (such as amphetamines and opioids) and illegal drugs (such as cocaine and methamphetamine). Your doctor can help you figure out what type of treatment is best for you. · Protect your skin from too much sun. When you're outdoors from 10 a.m. to 4 p.m., stay in the shade or cover up with clothing and a hat with a wide brim. Wear sunglasses that block UV rays. Even when it's cloudy, put broad-spectrum sunscreen (SPF 30 or higher) on any exposed skin. · See a dentist one or two times a year for checkups and to have your teeth cleaned. · Wear a seat belt in the car. When should you call for help? Watch closely for changes in your health, and be sure to contact your doctor if you have any problems or symptoms that concern you. Where can you learn more? Go to http://www.gray.com/  Enter K814 in the search box to learn more about \"Well Visit, Men 48 to 72: Care Instructions. \"  Current as of: October 6, 2021               Content Version: 13.2  © 7012-2859 [employer]. Care instructions adapted under license by [employer] (which disclaims liability or warranty for this information). If you have questions about a medical condition or this instruction, always ask your healthcare professional. [address] any warranty or liability for your use of this information.

## 2022-06-09 LAB
ALBUMIN SERPL-MCNC: 4.6 G/DL (ref 4–5)
ALBUMIN/GLOB SERPL: 2.3 {RATIO} (ref 1.2–2.2)
ALP SERPL-CCNC: 36 IU/L (ref 44–121)
ALT SERPL-CCNC: 28 IU/L (ref 0–44)
APPEARANCE UR: CLEAR
AST SERPL-CCNC: 19 IU/L (ref 0–40)
BASOPHILS # BLD AUTO: 0.1 X10E3/UL (ref 0–0.2)
BASOPHILS NFR BLD AUTO: 1 %
BILIRUB SERPL-MCNC: 0.7 MG/DL (ref 0–1.2)
BILIRUB UR QL STRIP: NEGATIVE
BUN SERPL-MCNC: 14 MG/DL (ref 6–24)
BUN/CREAT SERPL: 13 (ref 9–20)
CALCIUM SERPL-MCNC: 9.8 MG/DL (ref 8.7–10.2)
CHLORIDE SERPL-SCNC: 103 MMOL/L (ref 96–106)
CHOLEST SERPL-MCNC: 237 MG/DL (ref 100–199)
CO2 SERPL-SCNC: 22 MMOL/L (ref 20–29)
COLOR UR: YELLOW
CREAT SERPL-MCNC: 1.09 MG/DL (ref 0.76–1.27)
EGFR: 83 ML/MIN/1.73
EOSINOPHIL # BLD AUTO: 0.3 X10E3/UL (ref 0–0.4)
EOSINOPHIL NFR BLD AUTO: 5 %
ERYTHROCYTE [DISTWIDTH] IN BLOOD BY AUTOMATED COUNT: 13.4 % (ref 11.6–15.4)
GLOBULIN SER CALC-MCNC: 2 G/DL (ref 1.5–4.5)
GLUCOSE SERPL-MCNC: 110 MG/DL (ref 65–99)
GLUCOSE UR QL STRIP: NEGATIVE
HCT VFR BLD AUTO: 46 % (ref 37.5–51)
HDLC SERPL-MCNC: 43 MG/DL
HGB BLD-MCNC: 15.2 G/DL (ref 13–17.7)
HGB UR QL STRIP: NEGATIVE
IMM GRANULOCYTES # BLD AUTO: 0 X10E3/UL (ref 0–0.1)
IMM GRANULOCYTES NFR BLD AUTO: 0 %
KETONES UR QL STRIP: NEGATIVE
LDLC SERPL CALC-MCNC: 159 MG/DL (ref 0–99)
LEUKOCYTE ESTERASE UR QL STRIP: NEGATIVE
LYMPHOCYTES # BLD AUTO: 2.2 X10E3/UL (ref 0.7–3.1)
LYMPHOCYTES NFR BLD AUTO: 31 %
MCH RBC QN AUTO: 29 PG (ref 26.6–33)
MCHC RBC AUTO-ENTMCNC: 33 G/DL (ref 31.5–35.7)
MCV RBC AUTO: 88 FL (ref 79–97)
MICRO URNS: NORMAL
MONOCYTES # BLD AUTO: 0.4 X10E3/UL (ref 0.1–0.9)
MONOCYTES NFR BLD AUTO: 5 %
NEUTROPHILS # BLD AUTO: 4.1 X10E3/UL (ref 1.4–7)
NEUTROPHILS NFR BLD AUTO: 58 %
NITRITE UR QL STRIP: NEGATIVE
PH UR STRIP: 6 [PH] (ref 5–7.5)
PLATELET # BLD AUTO: 260 X10E3/UL (ref 150–450)
POTASSIUM SERPL-SCNC: 5.4 MMOL/L (ref 3.5–5.2)
PROT SERPL-MCNC: 6.6 G/DL (ref 6–8.5)
PROT UR QL STRIP: NEGATIVE
PSA SERPL-MCNC: 0.8 NG/ML (ref 0–4)
RBC # BLD AUTO: 5.24 X10E6/UL (ref 4.14–5.8)
SODIUM SERPL-SCNC: 142 MMOL/L (ref 134–144)
SP GR UR STRIP: 1 (ref 1–1.03)
TRIGL SERPL-MCNC: 191 MG/DL (ref 0–149)
UROBILINOGEN UR STRIP-MCNC: 0.2 MG/DL (ref 0.2–1)
VLDLC SERPL CALC-MCNC: 35 MG/DL (ref 5–40)
WBC # BLD AUTO: 7 X10E3/UL (ref 3.4–10.8)

## 2022-06-13 NOTE — PROGRESS NOTES
Your PSA test which is a screening test for prostate cancer was normal.  Your complete blood count is normal.  Glucose is normal at 110. Your liver and kidney function are normal.  Your cholesterol is mildly elevated. Your only other risk factor is a history of tobacco use. I would simply monitor your cholesterol profile at this time. I would consider doing a cardiac CT scan to screen for coronary atherosclerosis. If present I would consider taking medication for cholesterol. If you are interested in getting this test I will be happy to arrange for you.

## 2022-06-20 DIAGNOSIS — F41.9 ANXIETY: ICD-10-CM

## 2022-06-20 NOTE — TELEPHONE ENCOUNTER
PCP: Cheli Cardenas MD    Last appt: 6/8/2022  Future Appointments   Date Time Provider Eun Davila   6/13/2023  9:30 AM Cheli Cardenas MD [facility] AMB       Requested Prescriptions     Pending Prescriptions Disp Refills    ALPRAZolam (XANAX) 0.25 mg tablet 30 Tablet 0     Sig: Take 1 Tablet by mouth three (3) times daily as needed for Anxiety.  Max Daily Amount: 0.75 mg.       Prior labs and Blood pressures:  BP Readings from Last 3 Encounters:   06/08/22 124/80   10/11/21 128/88   06/04/21 124/82     Lab Results   Component Value Date/Time    Sodium 142 06/08/2022 10:27 AM    Potassium 5.4 (H) 06/08/2022 10:27 AM    Chloride 103 06/08/2022 10:27 AM    CO2 22 06/08/2022 10:27 AM    Anion gap 2 (L) 06/04/2021 10:25 AM    Glucose 110 (H) 06/08/2022 10:27 AM    BUN 14 06/08/2022 10:27 AM    Creatinine 1.09 06/08/2022 10:27 AM    BUN/Creatinine ratio 13 06/08/2022 10:27 AM    GFR est AA >60 06/04/2021 10:25 AM    GFR est non-AA >60 06/04/2021 10:25 AM    Calcium 9.8 06/08/2022 10:27 AM     Lab Results   Component Value Date/Time    Hemoglobin A1c 6.0 09/27/2017 10:24 AM     Lab Results   Component Value Date/Time    Cholesterol, total 237 (H) 06/08/2022 10:27 AM    HDL Cholesterol 43 06/08/2022 10:27 AM    LDL, calculated 159 (H) 06/08/2022 10:27 AM    LDL, calculated 157.6 (H) 06/04/2021 10:25 AM    VLDL, calculated 35 06/08/2022 10:27 AM    VLDL, calculated 28.4 06/04/2021 10:25 AM    Triglyceride 191 (H) 06/08/2022 10:27 AM    CHOL/HDL Ratio 5.0 06/04/2021 10:25 AM     Lab Results   Component Value Date/Time    Vitamin D 25-Hydroxy 31.6 09/27/2017 10:24 AM       Lab Results   Component Value Date/Time    TSH 2.05 02/22/2016 04:20 PM

## 2022-06-21 RX ORDER — ALPRAZOLAM 0.25 MG/1
0.25 TABLET ORAL
Qty: 30 TABLET | Refills: 0 | Status: SHIPPED | OUTPATIENT
Start: 2022-06-21 | End: 2022-07-05 | Stop reason: SDUPTHER

## 2022-07-05 DIAGNOSIS — F41.9 ANXIETY: ICD-10-CM

## 2022-07-05 NOTE — TELEPHONE ENCOUNTER
PCP: Isaiah Troncoso MD    Last appt: 6/8/2022  Future Appointments   Date Time Provider Eun Davila   6/13/2023  9:30 AM Isaiah Troncoso MD [facility] AMB       Requested Prescriptions     Pending Prescriptions Disp Refills    sertraline (ZOLOFT) 50 mg tablet 30 Tablet 5     Sig: Take 1 Tablet by mouth daily.  ALPRAZolam (XANAX) 0.25 mg tablet 30 Tablet 0     Sig: Take 1 Tablet by mouth three (3) times daily as needed for Anxiety.  Max Daily Amount: 0.75 mg.       Prior labs and Blood pressures:  BP Readings from Last 3 Encounters:   06/08/22 124/80   10/11/21 128/88   06/04/21 124/82     Lab Results   Component Value Date/Time    Sodium 142 06/08/2022 10:27 AM    Potassium 5.4 (H) 06/08/2022 10:27 AM    Chloride 103 06/08/2022 10:27 AM    CO2 22 06/08/2022 10:27 AM    Anion gap 2 (L) 06/04/2021 10:25 AM    Glucose 110 (H) 06/08/2022 10:27 AM    BUN 14 06/08/2022 10:27 AM    Creatinine 1.09 06/08/2022 10:27 AM    BUN/Creatinine ratio 13 06/08/2022 10:27 AM    GFR est AA >60 06/04/2021 10:25 AM    GFR est non-AA >60 06/04/2021 10:25 AM    Calcium 9.8 06/08/2022 10:27 AM     Lab Results   Component Value Date/Time    Hemoglobin A1c 6.0 09/27/2017 10:24 AM     Lab Results   Component Value Date/Time    Cholesterol, total 237 (H) 06/08/2022 10:27 AM    HDL Cholesterol 43 06/08/2022 10:27 AM    LDL, calculated 159 (H) 06/08/2022 10:27 AM    LDL, calculated 157.6 (H) 06/04/2021 10:25 AM    VLDL, calculated 35 06/08/2022 10:27 AM    VLDL, calculated 28.4 06/04/2021 10:25 AM    Triglyceride 191 (H) 06/08/2022 10:27 AM    CHOL/HDL Ratio 5.0 06/04/2021 10:25 AM     Lab Results   Component Value Date/Time    Vitamin D 25-Hydroxy 31.6 09/27/2017 10:24 AM       Lab Results   Component Value Date/Time    TSH 2.05 02/22/2016 04:20 PM

## 2022-07-07 RX ORDER — SERTRALINE HYDROCHLORIDE 50 MG/1
50 TABLET, FILM COATED ORAL DAILY
Qty: 30 TABLET | Refills: 5 | Status: SHIPPED | OUTPATIENT
Start: 2022-07-07 | End: 2022-10-04 | Stop reason: SDUPTHER

## 2022-07-07 RX ORDER — ALPRAZOLAM 0.25 MG/1
0.25 TABLET ORAL
Qty: 30 TABLET | Refills: 0 | Status: SHIPPED | OUTPATIENT
Start: 2022-07-07 | End: 2022-07-22 | Stop reason: SDUPTHER

## 2022-08-09 DIAGNOSIS — F41.9 ANXIETY: ICD-10-CM

## 2022-08-09 RX ORDER — ALPRAZOLAM 0.25 MG/1
0.25 TABLET ORAL
Qty: 30 TABLET | Refills: 2 | Status: SHIPPED | OUTPATIENT
Start: 2022-08-09 | End: 2022-08-26 | Stop reason: SDUPTHER

## 2022-08-26 DIAGNOSIS — F41.9 ANXIETY: ICD-10-CM

## 2022-08-26 RX ORDER — ALPRAZOLAM 0.25 MG/1
0.25 TABLET ORAL
Qty: 30 TABLET | Refills: 2 | Status: SHIPPED | OUTPATIENT
Start: 2022-08-26 | End: 2022-09-08 | Stop reason: SDUPTHER

## 2022-08-26 NOTE — TELEPHONE ENCOUNTER
Last Refill: 8-9-22  Last Visit: 6/8/2022   Next Visit: 6-13-22    Requested Prescriptions     Pending Prescriptions Disp Refills    ALPRAZolam (XANAX) 0.25 mg tablet 30 Tablet 2     Sig: Take 1 Tablet by mouth three (3) times daily as needed for Anxiety. Max Daily Amount: 0.75 mg.

## 2022-08-29 RX ORDER — PREDNISONE 10 MG/1
TABLET ORAL
Qty: 21 TABLET | Refills: 0 | Status: CANCELLED | OUTPATIENT
Start: 2022-08-29

## 2022-08-29 RX ORDER — PREDNISONE 10 MG/1
TABLET ORAL
Qty: 21 TABLET | Refills: 0 | Status: SHIPPED | OUTPATIENT
Start: 2022-08-29

## 2022-09-08 DIAGNOSIS — F41.9 ANXIETY: ICD-10-CM

## 2022-09-08 RX ORDER — ALPRAZOLAM 0.25 MG/1
0.25 TABLET ORAL
Qty: 30 TABLET | Refills: 2 | Status: SHIPPED | OUTPATIENT
Start: 2022-09-08 | End: 2022-09-23 | Stop reason: SDUPTHER

## 2022-09-23 DIAGNOSIS — F41.9 ANXIETY: ICD-10-CM

## 2022-09-23 RX ORDER — ALPRAZOLAM 0.25 MG/1
0.25 TABLET ORAL
Qty: 30 TABLET | Refills: 2 | Status: SHIPPED | OUTPATIENT
Start: 2022-09-23 | End: 2022-10-11 | Stop reason: SDUPTHER

## 2022-09-23 NOTE — TELEPHONE ENCOUNTER
PCP: Vinh Barrera MD    Last appt: 6/8/2022  Future Appointments   Date Time Provider Eun Davila   6/13/2023  9:30 AM Vinh Barrera MD [facility] AMB       Requested Prescriptions     Pending Prescriptions Disp Refills    ALPRAZolam (XANAX) 0.25 mg tablet 30 Tablet 2     Sig: Take 1 Tablet by mouth three (3) times daily as needed for Anxiety. Max Daily Amount: 0.75 mg.          Other Comments:

## 2022-10-04 RX ORDER — SERTRALINE HYDROCHLORIDE 50 MG/1
50 TABLET, FILM COATED ORAL DAILY
Qty: 30 TABLET | Refills: 5 | Status: SHIPPED | OUTPATIENT
Start: 2022-10-04

## 2022-10-04 NOTE — TELEPHONE ENCOUNTER
Last Refill: 7-7-22  Last Visit: 6/8/2022   Next Visit: 6-13-22    Requested Prescriptions     Pending Prescriptions Disp Refills    sertraline (ZOLOFT) 50 mg tablet 30 Tablet 5     Sig: Take 1 Tablet by mouth daily.

## 2022-10-11 DIAGNOSIS — F41.9 ANXIETY: ICD-10-CM

## 2022-10-11 RX ORDER — ALPRAZOLAM 0.25 MG/1
0.25 TABLET ORAL
Qty: 30 TABLET | Refills: 2 | Status: SHIPPED | OUTPATIENT
Start: 2022-10-11 | End: 2022-11-04 | Stop reason: SDUPTHER

## 2022-10-11 NOTE — TELEPHONE ENCOUNTER
PCP: Azael Black MD    Last appt: 6/8/2022  Future Appointments   Date Time Provider Eun Davila   6/13/2023  9:30 AM Azael Black MD [facility]B       Requested Prescriptions     Pending Prescriptions Disp Refills    ALPRAZolam (XANAX) 0.25 mg tablet 30 Tablet 2     Sig: Take 1 Tablet by mouth three (3) times daily as needed for Anxiety. Max Daily Amount: 0.75 mg.          Other Comments:

## 2022-11-04 DIAGNOSIS — F41.9 ANXIETY: ICD-10-CM

## 2022-11-04 RX ORDER — ALPRAZOLAM 0.25 MG/1
0.25 TABLET ORAL
Qty: 30 TABLET | Refills: 2 | Status: SHIPPED | OUTPATIENT
Start: 2022-11-04

## 2022-11-18 RX ORDER — SERTRALINE HYDROCHLORIDE 50 MG/1
50 TABLET, FILM COATED ORAL DAILY
Qty: 30 TABLET | Refills: 5 | Status: SHIPPED | OUTPATIENT
Start: 2022-11-18

## 2022-11-18 NOTE — TELEPHONE ENCOUNTER
PCP: Reuben Camerno MD    Last appt: 6/8/2022  Future Appointments   Date Time Provider Eun Davila   6/13/2023  9:30 AM Reuben aCmeron MD [facility]B       Requested Prescriptions     Pending Prescriptions Disp Refills    sertraline (ZOLOFT) 50 mg tablet 30 Tablet 5     Sig: Take 1 Tablet by mouth daily.        Prior labs and Blood pressures:  BP Readings from Last 3 Encounters:   06/08/22 124/80   10/11/21 128/88   06/04/21 124/82     Lab Results   Component Value Date/Time    Sodium 142 06/08/2022 10:27 AM    Potassium 5.4 (H) 06/08/2022 10:27 AM    Chloride 103 06/08/2022 10:27 AM    CO2 22 06/08/2022 10:27 AM    Anion gap 2 (L) 06/04/2021 10:25 AM    Glucose 110 (H) 06/08/2022 10:27 AM    BUN 14 06/08/2022 10:27 AM    Creatinine 1.09 06/08/2022 10:27 AM    BUN/Creatinine ratio 13 06/08/2022 10:27 AM    GFR est AA >60 06/04/2021 10:25 AM    GFR est non-AA >60 06/04/2021 10:25 AM    Calcium 9.8 06/08/2022 10:27 AM     Lab Results   Component Value Date/Time    Hemoglobin A1c 6.0 09/27/2017 10:24 AM     Lab Results   Component Value Date/Time    Cholesterol, total 237 (H) 06/08/2022 10:27 AM    HDL Cholesterol 43 06/08/2022 10:27 AM    LDL, calculated 159 (H) 06/08/2022 10:27 AM    LDL, calculated 157.6 (H) 06/04/2021 10:25 AM    VLDL, calculated 35 06/08/2022 10:27 AM    VLDL, calculated 28.4 06/04/2021 10:25 AM    Triglyceride 191 (H) 06/08/2022 10:27 AM    CHOL/HDL Ratio 5.0 06/04/2021 10:25 AM     Lab Results   Component Value Date/Time    Vitamin D 25-Hydroxy 31.6 09/27/2017 10:24 AM       Lab Results   Component Value Date/Time    TSH 2.05 02/22/2016 04:20 PM

## 2022-11-18 NOTE — TELEPHONE ENCOUNTER
Last Refill: 10-4-22  Last Visit: 6/8/2022   Next Visit: 6-13-23    Requested Prescriptions     Pending Prescriptions Disp Refills    sertraline (ZOLOFT) 50 mg tablet 30 Tablet 5     Sig: Take 1 Tablet by mouth daily.

## 2022-12-05 DIAGNOSIS — F41.9 ANXIETY: ICD-10-CM

## 2022-12-06 NOTE — TELEPHONE ENCOUNTER
PCP: Freedom Mauricio MD    Last appt: 6/8/2022  Future Appointments   Date Time Provider Eun Davila   6/13/2023  9:30 AM Freedom Mauricio MD [facility] AMB       Requested Prescriptions     Pending Prescriptions Disp Refills    ALPRAZolam (XANAX) 0.25 mg tablet 30 Tablet 2     Sig: Take 1 Tablet by mouth three (3) times daily as needed for Anxiety.  Max Daily Amount: 0.75 mg.       Prior labs and Blood pressures:  BP Readings from Last 3 Encounters:   06/08/22 124/80   10/11/21 128/88   06/04/21 124/82     Lab Results   Component Value Date/Time    Sodium 142 06/08/2022 10:27 AM    Potassium 5.4 (H) 06/08/2022 10:27 AM    Chloride 103 06/08/2022 10:27 AM    CO2 22 06/08/2022 10:27 AM    Anion gap 2 (L) 06/04/2021 10:25 AM    Glucose 110 (H) 06/08/2022 10:27 AM    BUN 14 06/08/2022 10:27 AM    Creatinine 1.09 06/08/2022 10:27 AM    BUN/Creatinine ratio 13 06/08/2022 10:27 AM    GFR est AA >60 06/04/2021 10:25 AM    GFR est non-AA >60 06/04/2021 10:25 AM    Calcium 9.8 06/08/2022 10:27 AM     Lab Results   Component Value Date/Time    Hemoglobin A1c 6.0 09/27/2017 10:24 AM     Lab Results   Component Value Date/Time    Cholesterol, total 237 (H) 06/08/2022 10:27 AM    HDL Cholesterol 43 06/08/2022 10:27 AM    LDL, calculated 159 (H) 06/08/2022 10:27 AM    LDL, calculated 157.6 (H) 06/04/2021 10:25 AM    VLDL, calculated 35 06/08/2022 10:27 AM    VLDL, calculated 28.4 06/04/2021 10:25 AM    Triglyceride 191 (H) 06/08/2022 10:27 AM    CHOL/HDL Ratio 5.0 06/04/2021 10:25 AM     Lab Results   Component Value Date/Time    Vitamin D 25-Hydroxy 31.6 09/27/2017 10:24 AM       Lab Results   Component Value Date/Time    TSH 2.05 02/22/2016 04:20 PM

## 2022-12-07 RX ORDER — ALPRAZOLAM 0.25 MG/1
0.25 TABLET ORAL
Qty: 30 TABLET | Refills: 2 | Status: SHIPPED | OUTPATIENT
Start: 2022-12-07

## 2022-12-26 DIAGNOSIS — F41.9 ANXIETY: ICD-10-CM

## 2022-12-27 RX ORDER — SERTRALINE HYDROCHLORIDE 50 MG/1
50 TABLET, FILM COATED ORAL DAILY
Qty: 30 TABLET | Refills: 5 | Status: SHIPPED | OUTPATIENT
Start: 2022-12-27

## 2022-12-27 RX ORDER — ALPRAZOLAM 0.25 MG/1
0.25 TABLET ORAL
Qty: 30 TABLET | Refills: 2 | Status: SHIPPED | OUTPATIENT
Start: 2022-12-27

## 2022-12-27 NOTE — TELEPHONE ENCOUNTER
PCP: Chad Hansen MD    Last appt: Visit date not found  Future Appointments   Date Time Provider Eun Davila   6/13/2023  9:30 AM Chad Hansen MD [facility] AM[facility]       Requested Prescriptions     Pending Prescriptions Disp Refills    sertraline (ZOLOFT) 50 mg tablet 30 Tablet 5     Sig: Take 1 Tablet by mouth daily. ALPRAZolam (XANAX) 0.25 mg tablet 30 Tablet 2     Sig: Take 1 Tablet by mouth three (3) times daily as needed for Anxiety.  Max Daily Amount: 0.75 mg.       Prior labs and Blood pressures:  BP Readings from Last 3 Encounters:   06/08/22 124/80   10/11/21 128/88   06/04/21 124/82     Lab Results   Component Value Date/Time    Sodium 142 06/08/2022 10:27 AM    Potassium 5.4 (H) 06/08/2022 10:27 AM    Chloride 103 06/08/2022 10:27 AM    CO2 22 06/08/2022 10:27 AM    Anion gap 2 (L) 06/04/2021 10:25 AM    Glucose 110 (H) 06/08/2022 10:27 AM    BUN 14 06/08/2022 10:27 AM    Creatinine 1.09 06/08/2022 10:27 AM    BUN/Creatinine ratio 13 06/08/2022 10:27 AM    GFR est AA >60 06/04/2021 10:25 AM    GFR est non-AA >60 06/04/2021 10:25 AM    Calcium 9.8 06/08/2022 10:27 AM     Lab Results   Component Value Date/Time    Hemoglobin A1c 6.0 09/27/2017 10:24 AM     Lab Results   Component Value Date/Time    Cholesterol, total 237 (H) 06/08/2022 10:27 AM    HDL Cholesterol 43 06/08/2022 10:27 AM    LDL, calculated 159 (H) 06/08/2022 10:27 AM    LDL, calculated 157.6 (H) 06/04/2021 10:25 AM    VLDL, calculated 35 06/08/2022 10:27 AM    VLDL, calculated 28.4 06/04/2021 10:25 AM    Triglyceride 191 (H) 06/08/2022 10:27 AM    CHOL/HDL Ratio 5.0 06/04/2021 10:25 AM     Lab Results   Component Value Date/Time    Vitamin D 25-Hydroxy 31.6 09/27/2017 10:24 AM       Lab Results   Component Value Date/Time    TSH 2.05 02/22/2016 04:20 PM

## 2023-01-18 DIAGNOSIS — F41.9 ANXIETY: ICD-10-CM

## 2023-01-18 RX ORDER — ALPRAZOLAM 0.25 MG/1
0.25 TABLET ORAL
Qty: 30 TABLET | Refills: 2 | Status: SHIPPED | OUTPATIENT
Start: 2023-01-18

## 2023-01-18 NOTE — TELEPHONE ENCOUNTER
Last Refill: 12-27-22  Last Visit: 6-8-22  Next Visit: 6/13/2023     Requested Prescriptions     Pending Prescriptions Disp Refills    ALPRAZolam (XANAX) 0.25 mg tablet 30 Tablet 2     Sig: Take 1 Tablet by mouth three (3) times daily as needed for Anxiety. Max Daily Amount: 0.75 mg.

## 2023-02-02 NOTE — TELEPHONE ENCOUNTER
Last Refill: 12-27-22  Last Visit: 6-8-22  Next Visit: 6/13/2023     Requested Prescriptions     Pending Prescriptions Disp Refills    sertraline (ZOLOFT) 50 mg tablet 30 Tablet 5     Sig: Take 1 Tablet by mouth daily.

## 2023-02-03 RX ORDER — SERTRALINE HYDROCHLORIDE 50 MG/1
50 TABLET, FILM COATED ORAL DAILY
Qty: 30 TABLET | Refills: 5 | Status: SHIPPED | OUTPATIENT
Start: 2023-02-03

## 2023-03-03 DIAGNOSIS — F41.9 ANXIETY: ICD-10-CM

## 2023-03-03 RX ORDER — PREDNISONE 10 MG/1
TABLET ORAL
Qty: 21 TABLET | Refills: 0 | Status: SHIPPED | OUTPATIENT
Start: 2023-03-03

## 2023-03-03 RX ORDER — ALPRAZOLAM 0.25 MG/1
0.25 TABLET ORAL
Qty: 30 TABLET | Refills: 2 | Status: SHIPPED | OUTPATIENT
Start: 2023-03-03

## 2023-03-03 NOTE — TELEPHONE ENCOUNTER
Last Refill: Prednisone 8-29-22, Xanax 1-18-23  Last Visit: 6-8-22  Next Visit: 6/13/2023     Requested Prescriptions     Pending Prescriptions Disp Refills    predniSONE (STERAPRED DS) 10 mg dose pack 21 Tablet 0     Sig: See administration instruction per 10mg dose pack    ALPRAZolam (XANAX) 0.25 mg tablet 30 Tablet 2     Sig: Take 1 Tablet by mouth three (3) times daily as needed for Anxiety. Max Daily Amount: 0.75 mg.

## 2023-03-23 DIAGNOSIS — F41.9 ANXIETY: ICD-10-CM

## 2023-03-23 RX ORDER — ALPRAZOLAM 0.25 MG/1
0.25 TABLET ORAL
Qty: 30 TABLET | Refills: 2 | Status: SHIPPED | OUTPATIENT
Start: 2023-03-23

## 2023-03-23 RX ORDER — SERTRALINE HYDROCHLORIDE 50 MG/1
50 TABLET, FILM COATED ORAL DAILY
Qty: 30 TABLET | Refills: 5 | Status: SHIPPED | OUTPATIENT
Start: 2023-03-23

## 2023-03-23 NOTE — TELEPHONE ENCOUNTER
Last Refill: Zoloft 2-3-23, Xanax 3-3-23  Last Visit: 6-8-22  Next Visit: 6/13/2023     Requested Prescriptions     Pending Prescriptions Disp Refills    sertraline (ZOLOFT) 50 mg tablet 30 Tablet 5     Sig: Take 1 Tablet by mouth daily. ALPRAZolam (XANAX) 0.25 mg tablet 30 Tablet 2     Sig: Take 1 Tablet by mouth three (3) times daily as needed for Anxiety. Max Daily Amount: 0.75 mg.

## 2023-05-09 ENCOUNTER — PATIENT MESSAGE (OUTPATIENT)
Facility: CLINIC | Age: 53
End: 2023-05-09

## 2023-05-09 DIAGNOSIS — F41.1 GENERALIZED ANXIETY DISORDER: Primary | ICD-10-CM

## 2023-05-09 NOTE — TELEPHONE ENCOUNTER
From: Cameron Bro II  To: Dr. Cameron Dos Santos: 5/9/2023 12:26 PM EDT  Subject: Prescriptions     Good afternoon Dr Jatin Mariscal,    Not sure why I am unable to refill my prescriptions on MyChart. I also still have not had my first colonoscopy, unsure of the reasoning , I believe may have to do with Dr Olivia lou for time.     JASPAL

## 2023-05-09 NOTE — TELEPHONE ENCOUNTER
Last Refill: 3-23-23  Last Visit: 6/8/2022   Next Visit: 6/13/2023     Requested Prescriptions     Pending Prescriptions Disp Refills    sertraline (ZOLOFT) 50 MG tablet 30 tablet 3     Sig: Take 1 tablet by mouth daily    ALPRAZolam (XANAX) 0.25 MG tablet 30 tablet 1     Sig: Take 1 tablet by mouth 3 times daily as needed for Anxiety for up to 10 days.  Max Daily Amount: 0.75 mg

## 2023-05-10 DIAGNOSIS — F41.1 GENERALIZED ANXIETY DISORDER: ICD-10-CM

## 2023-05-10 RX ORDER — ALPRAZOLAM 0.25 MG/1
0.25 TABLET ORAL 3 TIMES DAILY PRN
Qty: 30 TABLET | Refills: 1 | Status: SHIPPED | OUTPATIENT
Start: 2023-05-10 | End: 2023-05-20

## 2023-05-10 RX ORDER — ALPRAZOLAM 0.25 MG/1
0.25 TABLET ORAL 3 TIMES DAILY PRN
Qty: 30 TABLET | Refills: 1 | Status: SHIPPED | OUTPATIENT
Start: 2023-05-10 | End: 2023-05-10 | Stop reason: SDUPTHER

## 2023-05-10 NOTE — TELEPHONE ENCOUNTER
Please resend Rxs to [facility]- They printed earlier. Last Visit: 6/8/2022   Next Visit: 6/13/2023     Requested Prescriptions     Pending Prescriptions Disp Refills    sertraline (ZOLOFT) 50 MG tablet 30 tablet 3     Sig: Take 1 tablet by mouth daily    ALPRAZolam (XANAX) 0.25 MG tablet 30 tablet 1     Sig: Take 1 tablet by mouth 3 times daily as needed for Anxiety for up to 10 days.  Max Daily Amount: 0.75 mg

## 2023-06-13 PROBLEM — M54.2 CERVICALGIA: Status: ACTIVE | Noted: 2023-06-13

## 2023-06-19 ENCOUNTER — PATIENT MESSAGE (OUTPATIENT)
Facility: CLINIC | Age: 53
End: 2023-06-19

## 2023-06-19 DIAGNOSIS — F41.1 GAD (GENERALIZED ANXIETY DISORDER): Primary | ICD-10-CM

## 2023-06-19 NOTE — TELEPHONE ENCOUNTER
Last Refill: Xanax 3-23-23, Prednisone 3-3-23  Last Visit: 6/13/2023   Next Visit: 6-18-24    Requested Prescriptions     Pending Prescriptions Disp Refills    ALPRAZolam (XANAX) 0.25 MG tablet 30 tablet 0     Sig: Take 1 tablet by mouth 3 times daily as needed for Anxiety for up to 30 days.  Max Daily Amount: 0.75 mg    predniSONE 10 MG (21) TBPK 21 each 0     Sig: See administration instruction per 10mg dose pack

## 2023-06-19 NOTE — TELEPHONE ENCOUNTER
PCP: Ana Wilson MD    Last appt:6/13/23  Future Appointments   Date Time Provider [address]   6/18/2024  9:30 AM C Jackelyn Steen MD [facility] BS AMB       Last refilled:5/10/23    Requested Prescriptions     Pending Prescriptions Disp Refills    sertraline (ZOLOFT) 50 MG tablet 30 tablet 3     Sig: Take 1 tablet by mouth daily

## 2023-06-19 NOTE — TELEPHONE ENCOUNTER
From: Emelyn Narvaez II  To: Dr. [facility]: 6/19/2023 10:34 AM EDT  Subject: Prescriptions    Good morn,    Unable to refill the Xanax and would like to refill the Prednisone pack for my back.     Thank you   Maricel Cortez II

## 2023-06-20 RX ORDER — ALPRAZOLAM 0.25 MG/1
0.25 TABLET ORAL 3 TIMES DAILY PRN
Qty: 30 TABLET | Refills: 0 | Status: SHIPPED | OUTPATIENT
Start: 2023-06-20 | End: 2023-07-20

## 2023-06-20 RX ORDER — PREDNISONE 10 MG/1
TABLET ORAL
Qty: 21 EACH | Refills: 0 | Status: SHIPPED | OUTPATIENT
Start: 2023-06-20

## 2023-07-21 ENCOUNTER — HOSPITAL ENCOUNTER (OUTPATIENT)
Facility: HOSPITAL | Age: 53
End: 2023-07-21
Attending: ORTHOPAEDIC SURGERY
Payer: MEDICAID

## 2023-07-21 DIAGNOSIS — M47.816 LUMBAR SPONDYLOSIS: ICD-10-CM

## 2023-07-21 DIAGNOSIS — M54.6 PAIN IN THORACIC SPINE: ICD-10-CM

## 2023-07-21 DIAGNOSIS — M54.50 ACUTE LOW BACK PAIN, UNSPECIFIED BACK PAIN LATERALITY, UNSPECIFIED WHETHER SCIATICA PRESENT: ICD-10-CM

## 2023-07-21 DIAGNOSIS — M47.812 CERVICAL SPONDYLOSIS WITHOUT MYELOPATHY: ICD-10-CM

## 2023-07-21 DIAGNOSIS — M47.814 THORACIC SPONDYLOSIS WITHOUT MYELOPATHY: ICD-10-CM

## 2023-07-21 DIAGNOSIS — M54.2 CERVICALGIA: ICD-10-CM

## 2023-07-21 PROCEDURE — 72146 MRI CHEST SPINE W/O DYE: CPT

## 2023-07-21 PROCEDURE — 72148 MRI LUMBAR SPINE W/O DYE: CPT

## 2023-07-25 ENCOUNTER — PATIENT MESSAGE (OUTPATIENT)
Facility: CLINIC | Age: 53
End: 2023-07-25

## 2023-07-25 DIAGNOSIS — F41.1 GAD (GENERALIZED ANXIETY DISORDER): ICD-10-CM

## 2023-07-25 RX ORDER — ALPRAZOLAM 0.25 MG/1
0.25 TABLET ORAL 3 TIMES DAILY PRN
Qty: 30 TABLET | Refills: 2 | Status: SHIPPED | OUTPATIENT
Start: 2023-07-25 | End: 2023-08-31 | Stop reason: SDUPTHER

## 2023-07-25 NOTE — TELEPHONE ENCOUNTER
Last Refill: 6-20-23  Last Visit: 6/13/2023   Next Visit: 6-18-24    Requested Prescriptions     Pending Prescriptions Disp Refills    ALPRAZolam (XANAX) 0.25 MG tablet 30 tablet 0     Sig: Take 1 tablet by mouth 3 times daily as needed for Anxiety for up to 30 days.  Max Daily Amount: 0.75 mg

## 2023-07-25 NOTE — TELEPHONE ENCOUNTER
From: Deetta Agent II  To: Dr. Evon Patterson: 7/25/2023 10:33 AM EDT  Subject: Refill Xanax    Could I please refill the Xanax prescription.  Thank you

## 2023-08-31 RX ORDER — ALPRAZOLAM 0.25 MG/1
0.25 TABLET ORAL 3 TIMES DAILY PRN
Qty: 30 TABLET | Refills: 2 | Status: SHIPPED | OUTPATIENT
Start: 2023-08-31 | End: 2023-09-30

## 2023-08-31 NOTE — TELEPHONE ENCOUNTER
Last Refill: 7-25-23  Last Visit: 6/13/2023   Next Visit: Visit date not found     Requested Prescriptions     Pending Prescriptions Disp Refills    ALPRAZolam (XANAX) 0.25 MG tablet 30 tablet 2     Sig: Take 1 tablet by mouth 3 times daily as needed for Anxiety for up to 30 days. Max Daily Amount: 0.75 mg     Signed Prescriptions Disp Refills    ALPRAZolam (XANAX) 0.25 MG tablet 30 tablet 2     Sig: Take 1 tablet by mouth 3 times daily as needed for Anxiety for up to 30 days.  Max Daily Amount: 0.75 mg     Authorizing Provider: Zayda Torres

## 2023-10-24 NOTE — TELEPHONE ENCOUNTER
Last Refill: 6-19-23  Last Visit: 6/13/2023   Next Visit: 6-18-24    Requested Prescriptions     Pending Prescriptions Disp Refills    sertraline (ZOLOFT) 50 MG tablet 90 tablet 3     Sig: Take 1 tablet by mouth daily

## 2023-10-26 ENCOUNTER — CLINICAL DOCUMENTATION (OUTPATIENT)
Facility: CLINIC | Age: 53
End: 2023-10-26

## 2023-11-21 DIAGNOSIS — F41.1 GAD (GENERALIZED ANXIETY DISORDER): ICD-10-CM

## 2023-11-21 RX ORDER — ALPRAZOLAM 0.25 MG/1
0.25 TABLET ORAL 3 TIMES DAILY PRN
Qty: 30 TABLET | Refills: 2 | Status: SHIPPED | OUTPATIENT
Start: 2023-11-21 | End: 2023-12-21

## 2023-11-21 NOTE — TELEPHONE ENCOUNTER
PCP: Nathan Henry MD    Last appt: 6/13/2023    Future Appointments   Date Time Provider [address]  [address]   6/18/2024  9:30 AM Nathan Henry MD [facility] BS AMB       Requested Prescriptions     Pending Prescriptions Disp Refills    ALPRAZolam (XANAX) 0.25 MG tablet 30 tablet 2     Sig: Take 1 tablet by mouth 3 times daily as needed for Anxiety for up to 30 days.  Max Daily Amount: 0.75 mg

## 2023-12-19 RX ORDER — TIZANIDINE 2 MG/1
2 TABLET ORAL EVERY 6 HOURS PRN
COMMUNITY
Start: 2023-10-25

## 2023-12-19 NOTE — PERIOP NOTE
[facility]  Ambulatory Surgery Unit  Pre-operative Instructions    Procedure Date  Tuesday 1/9            Tentative Arrival Time 10:30 am      1. On the day of your procedure, please report to the Ambulatory Surgery Unit Registration Desk and sign in at your designated time. The Ambulatory Surgery Unit is located in [facility] on the [facility] side of the [facility] across from the [facility]. Please have all of your health insurance cards, copayment, and a photo ID.    **TWO adults may accompany you the day of the procedure.  We have limited seating available.  If our waiting room is at capacity, your ride may be asked to remain in their vehicle.  No one under 15 is allowed in the waiting room.   Masks, fully covering the mouth and nose, are required in the waiting room.    2. You must have someone with you to drive you home as directed by your surgeon.    3. You may have a light breakfast and take normal morning medications.    4. We recommend you do not drink any alcoholic beverages for 24 hours before and after your procedure.    5. Contact your surgeon’s office for instructions on the following medications: non-steroidal anti-inflammatory drugs (i.e. Advil, Aleve), vitamins, and supplements. (Some surgeon’s will want you to stop these medications prior to surgery and others may allow you to take them)   **If you are currently taking Plavix, Coumadin, Aspirin and/or other blood-thinning agents, contact your surgeon for instructions.** Your surgeon will partner with the physician prescribing these medications to determine if it is safe to stop or if you need to continue taking. Please do not stop taking these medications without instructions from your surgeon.    6. In an effort to help prevent surgical site infection, we ask that you shower with an anti-bacterial soap (i.e. Dial or Safeguard) on the morning of your procedure. Do not apply any lotions, powders, or deodorants after

## 2023-12-22 ENCOUNTER — HOSPITAL ENCOUNTER (OUTPATIENT)
Facility: HOSPITAL | Age: 53
Setting detail: OUTPATIENT SURGERY
Discharge: HOME | End: 2023-12-22
Attending: INTERNAL MEDICINE | Admitting: INTERNAL MEDICINE
Payer: MEDICAID

## 2023-12-22 VITALS
RESPIRATION RATE: 15 BRPM | WEIGHT: 182 LBS | DIASTOLIC BLOOD PRESSURE: 85 MMHG | HEART RATE: 74 BPM | HEIGHT: 74 IN | TEMPERATURE: 98.9 F | BODY MASS INDEX: 23.36 KG/M2 | OXYGEN SATURATION: 100 % | SYSTOLIC BLOOD PRESSURE: 117 MMHG

## 2023-12-22 PROCEDURE — 88305 TISSUE EXAM BY PATHOLOGIST: CPT

## 2023-12-22 PROCEDURE — 6370000000 HC RX 637 (ALT 250 FOR IP): Performed by: INTERNAL MEDICINE

## 2023-12-22 PROCEDURE — 7100000010 HC PHASE II RECOVERY - FIRST 15 MIN: Performed by: INTERNAL MEDICINE

## 2023-12-22 PROCEDURE — 2709999900 HC NON-CHARGEABLE SUPPLY: Performed by: INTERNAL MEDICINE

## 2023-12-22 PROCEDURE — 3600007512: Performed by: INTERNAL MEDICINE

## 2023-12-22 PROCEDURE — 3700000001 HC ADD 15 MINUTES (ANESTHESIA): Performed by: INTERNAL MEDICINE

## 2023-12-22 PROCEDURE — 3600007502: Performed by: INTERNAL MEDICINE

## 2023-12-22 PROCEDURE — 7100000011 HC PHASE II RECOVERY - ADDTL 15 MIN: Performed by: INTERNAL MEDICINE

## 2023-12-22 PROCEDURE — 3700000000 HC ANESTHESIA ATTENDED CARE: Performed by: INTERNAL MEDICINE

## 2023-12-22 RX ORDER — SIMETHICONE 20 MG/.3ML
EMULSION ORAL PRN
Status: DISCONTINUED | OUTPATIENT
Start: 2023-12-22 | End: 2023-12-22 | Stop reason: ALTCHOICE

## 2023-12-22 RX ORDER — SODIUM CHLORIDE 0.9 % (FLUSH) 0.9 %
5-40 SYRINGE (ML) INJECTION EVERY 12 HOURS SCHEDULED
Status: DISCONTINUED | OUTPATIENT
Start: 2023-12-22 | End: 2023-12-22 | Stop reason: HOSPADM

## 2023-12-22 RX ORDER — SODIUM CHLORIDE 9 MG/ML
25 INJECTION, SOLUTION INTRAVENOUS PRN
Status: DISCONTINUED | OUTPATIENT
Start: 2023-12-22 | End: 2023-12-22 | Stop reason: HOSPADM

## 2023-12-22 RX ORDER — SODIUM CHLORIDE 9 MG/ML
INJECTION, SOLUTION INTRAVENOUS CONTINUOUS
Status: DISCONTINUED | OUTPATIENT
Start: 2023-12-22 | End: 2023-12-22 | Stop reason: HOSPADM

## 2023-12-22 RX ORDER — SODIUM CHLORIDE 0.9 % (FLUSH) 0.9 %
5-40 SYRINGE (ML) INJECTION PRN
Status: DISCONTINUED | OUTPATIENT
Start: 2023-12-22 | End: 2023-12-22 | Stop reason: HOSPADM

## 2023-12-22 NOTE — OP NOTE
[facility] [address] [facility]  [address], [address] [facility]        Colonoscopy Operative Report    Nitin Gallardo  [id number]  1970      Procedure Type:   Colonoscopy with polypectomy (cold biopsy)     Indications:    Screening colonoscopy         Pre-operative Diagnosis: see indication above    Post-operative Diagnosis:  See findings below    :  Charlene Arechiga MD    Staff: Circulator: Kirk Solomon RN; Anabel Espinoza RN     Referring Provider: Tan De La Fuente MD      Sedation:  MAC      Procedure Details:  After informed consent was obtained with all risks and benefits of procedure explained and preoperative exam completed, the patient was taken to the endoscopy suite and placed in the left lateral decubitus position. Upon sequential sedation as per above, a digital rectal exam was performed demonstrating internal hemorrhoids. The Olympus pediatric videocolonoscope  was inserted in the rectum and carefully advanced to the terminal ileum. The cecum was identified by the ileocecal valve and appendiceal orifice. The quality of preparation was good. The colonoscope was slowly withdrawn with careful evaluation between folds. Retroflexion in the rectum was completed. Findings:   Mild left-sided diverticulosis  Single 2 mm sessile polyp in transverse colon - removed with cold biopsy forceps. Specimen Removed:    Transverse colon polyp  Complications: None. EBL:  None. Impression:     As above    Recommendations:   Follow up surgical pathology  Repeat colonoscopy in 5 years  High fiber diet education    Signed By: Charlene Arechiga MD     12/22/2023  11:08 AM

## 2023-12-22 NOTE — DISCHARGE INSTRUCTIONS
[facility]    COLON DISCHARGE INSTRUCTIONS    Chick Clawson II  [id number]  1970    Discomfort:  Redness at IV site- apply warm compress to area; if redness or soreness persist- contact your physician  There may be a slight amount of blood passed from the rectum  Gaseous discomfort- walking, belching will help relieve any discomfort  You may not operate a vehicle for 12 hours  You may not engage in an occupation involving machinery or appliances for rest of today  You may not drink alcoholic beverages for at least 12 hours  Avoid making any critical decisions for at least 24 hour  DIET:  You may resume your regular diet - however -  remember your colon is empty and a heavy meal will produce gas. Avoid these foods:  vegetables, fried / greasy foods, carbonated drinks     ACTIVITY:  You may  resume your normal daily activities it is recommended that you spend the remainder of the day resting -  avoid any strenuous activity. CALL M.D. ANY SIGN OF:   Increasing pain, nausea, vomiting  Abdominal distension (swelling)  New increased bleeding (oral or rectal)  Fever (chills)  Pain in chest area  Bloody discharge from nose or mouth  Shortness of breath      Follow-up Instructions:   Call Dr. Marquise Beckwith for any questions or problems at [address]:   Your colonoscopy showed one small polyp, which was removed. We will contact you about the pathology results and when your next colonoscopy will be due. Please maintain a high fiber diet.   Telephone # [phone]      Signed By: Marquise Beckwith MD     12/22/2023  11:07 AM

## 2023-12-22 NOTE — PERIOP NOTE

## 2023-12-22 NOTE — H&P
[facility]  [address], [address] [facility]        History and Physical       NAME:  Ish Artis   :   1970   MRN:   [id number]             History of Present Illness:  Patient is a 47 y.o. who is seen for first screening colonoscopy. PMH:  Past Medical History:   Diagnosis Date    Alcohol abuse 10/02/2017    Annual physical exam 10/02/2017    Anxiety     Arthritis     Back pain 10/02/2017    Basal cell carcinoma 10/02/2017    Cervicalgia 2023    Depression, acute 10/02/2017    MAHENDRA (generalized anxiety disorder) 10/02/2017    Mood disorder ([address])     on topiramate, hx of alcoholism    Right anterior shoulder pain 10/02/2017    Right cervical radiculopathy 10/02/2017    Sleep disorder 10/02/2017    Torn meniscus 10/02/2017    Varicocele 10/02/2017       PSH:  Past Surgical History:   Procedure Laterality Date    HERNIA REPAIR      Scot Ahumada SURGERY  2004    Disc Fusion C5-C6 (Dr. Michelle Moyer, [facility])    OTHER SURGICAL HISTORY      lumbar ablation       Allergies:  No Known Allergies    Home Medications:  Prior to Admission Medications   Prescriptions Last Dose Informant Patient Reported?  Taking?   hydrOXYzine HCl (ATARAX) 50 MG tablet   Yes No   predniSONE 10 MG (21) TBPK   No No   Sig: See administration instruction per 10mg dose pack   sertraline (ZOLOFT) 50 MG tablet Past Week  No No   Sig: Take 1 tablet by mouth daily   Patient taking differently: Take 1 tablet by mouth every morning   tiZANidine (ZANAFLEX) 2 MG tablet Past Week  Yes No   Sig: Take 1 tablet by mouth every 6 hours as needed (muscle spasms)   traZODone (DESYREL) 100 MG tablet   Yes No   Patient not taking: Reported on 2023      Facility-Administered Medications: None       Hospital Medications:  Current Facility-Administered Medications   Medication Dose Route Frequency    0.9 % sodium chloride infusion   IntraVENous Continuous    sodium chloride edema     Data Review     No results for input(s): \"WBC\", \"HGB\", \"HCT\", \"PLT\" in the last 72 hours. No results for input(s): \"NA\", \"K\", \"CL\", \"CO2\", \"BUN\", \"CREA\", \"GLU\", \"PHOS\", \"CA\" in the last 72 hours. No results for input(s): \"TP\", \"ALB\", \"GLOB\", \"GGT\", \"AML\" in the last 72 hours. Invalid input(s): \"SGOT\", \"GPT\", \"AP\", \"TBIL\", \"AMYP\", \"LPSE\", \"HLPSE\"  No results for input(s): \"INR\", \"APTT\" in the last 72 hours. Invalid input(s): \"PTP\"       Assessment:     Colon cancer screening     Patient Active Problem List   Diagnosis    Sleep disorder    Alcohol abuse    Right anterior shoulder pain    Right cervical radiculopathy    Basal cell carcinoma    MAHENDRA (generalized anxiety disorder)    Varicocele    Depression, acute    Right groin pain    HNP (herniated nucleus pulposus), cervical    Back pain    Alcohol dependence ([address])    Torn meniscus    Cervicalgia     Plan:   The patient was counseled at length about the risks of jason Covid-19 in the mahnaz-operative and post-operative states including the recovery window of their procedure. The patient was made aware that jason Covid-19 after a surgical procedure may worsen their prognosis for recovering from the virus and lend to a higher morbidity and or mortality risk. The patient was given the options of postponing their procedure. All of the risks, benefits, and alternatives were discussed. The patient does wish to proceed with the procedure.   Endoscopic procedure with MAC     Signed By: Candelario Iqbal MD     12/22/2023  10:34 AM

## 2024-01-02 ENCOUNTER — PATIENT MESSAGE (OUTPATIENT)
Facility: CLINIC | Age: 54
End: 2024-01-02

## 2024-01-02 DIAGNOSIS — F41.1 GAD (GENERALIZED ANXIETY DISORDER): ICD-10-CM

## 2024-01-02 RX ORDER — ALPRAZOLAM 0.25 MG/1
0.25 TABLET ORAL 3 TIMES DAILY PRN
Qty: 30 TABLET | Refills: 2 | Status: SHIPPED | OUTPATIENT
Start: 2024-01-02 | End: 2024-02-01

## 2024-01-02 NOTE — TELEPHONE ENCOUNTER
From: Hayden Palomino II  To: Dr. SHAHEEN Tang  Sent: 1/2/2024 10:48 AM EST  Subject: Refill of Xanax    Still unable to refill on MyChart. Happy New Year!!

## 2024-01-09 ENCOUNTER — APPOINTMENT (OUTPATIENT)
Facility: HOSPITAL | Age: 54
End: 2024-01-09
Attending: PHYSICAL MEDICINE & REHABILITATION
Payer: MEDICAID

## 2024-01-09 ENCOUNTER — HOSPITAL ENCOUNTER (OUTPATIENT)
Facility: HOSPITAL | Age: 54
Setting detail: OUTPATIENT SURGERY
Discharge: HOME | End: 2024-01-09
Attending: PHYSICAL MEDICINE & REHABILITATION | Admitting: PHYSICAL MEDICINE & REHABILITATION
Payer: MEDICAID

## 2024-01-09 VITALS
OXYGEN SATURATION: 97 % | DIASTOLIC BLOOD PRESSURE: 100 MMHG | SYSTOLIC BLOOD PRESSURE: 148 MMHG | TEMPERATURE: 97.8 F | RESPIRATION RATE: 16 BRPM | HEART RATE: 62 BPM | BODY MASS INDEX: 23.87 KG/M2 | WEIGHT: 186 LBS | HEIGHT: 74 IN

## 2024-01-09 PROCEDURE — 7100000010 HC PHASE II RECOVERY - FIRST 15 MIN: Performed by: PHYSICAL MEDICINE & REHABILITATION

## 2024-01-09 PROCEDURE — 6360000002 HC RX W HCPCS: Performed by: PHYSICAL MEDICINE & REHABILITATION

## 2024-01-09 PROCEDURE — 2709999900 HC NON-CHARGEABLE SUPPLY: Performed by: PHYSICAL MEDICINE & REHABILITATION

## 2024-01-09 PROCEDURE — 3600000002 HC SURGERY LEVEL 2 BASE: Performed by: PHYSICAL MEDICINE & REHABILITATION

## 2024-01-09 PROCEDURE — 2500000003 HC RX 250 WO HCPCS: Performed by: PHYSICAL MEDICINE & REHABILITATION

## 2024-01-09 RX ORDER — LIDOCAINE HYDROCHLORIDE 20 MG/ML
10 INJECTION, SOLUTION EPIDURAL; INFILTRATION; INTRACAUDAL; PERINEURAL ONCE
Status: COMPLETED | OUTPATIENT
Start: 2024-01-09 | End: 2024-01-09

## 2024-01-09 RX ORDER — DEXAMETHASONE SODIUM PHOSPHATE 4 MG/ML
20 INJECTION, SOLUTION INTRA-ARTICULAR; INTRALESIONAL; INTRAMUSCULAR; INTRAVENOUS; SOFT TISSUE ONCE
Status: COMPLETED | OUTPATIENT
Start: 2024-01-09 | End: 2024-01-09

## 2024-01-09 RX ORDER — BUPIVACAINE HYDROCHLORIDE 5 MG/ML
10 INJECTION, SOLUTION EPIDURAL; INTRACAUDAL ONCE
Status: COMPLETED | OUTPATIENT
Start: 2024-01-09 | End: 2024-01-09

## 2024-01-09 ASSESSMENT — PAIN DESCRIPTION - DESCRIPTORS: DESCRIPTORS: ACHING

## 2024-01-09 ASSESSMENT — PAIN - FUNCTIONAL ASSESSMENT: PAIN_FUNCTIONAL_ASSESSMENT: 0-10

## 2024-01-09 NOTE — DISCHARGE INSTRUCTIONS
Discharge Instructions  Lumbar Facet Block/ Medial Branch Block  You had a lumbar facet injection today.  You will probably have some numbness in your low back area for the next 6 to 8 hours.  The steroids will slowly become effective, reducing your pain, over the next 2 weeks.  You should begin feeling better after a few days, but it may take up to 2 weeks to notice the difference.  The benefit you get from your injection will last a variable amount of time, depending on the severity of your lumbar spine problem.    If the results you experience are significant, but not long lasting, you may be a candidate for a procedure that can be longer lasting (radiofrequency ablation of the nerves innervating the facet joints).  Pain:  Most people do not have any increase in pain after this injection.  However, you might experience some soreness at the site of the injection.  If this happens, putting an ice pack over the sore area will help.  Bandage:  You have a small bandage covering the site of the injection.  You may remove it once you get home.  Restrictions:  Someone should drive you home after the injection.  After that, you have no restrictions.  You may resume your normal level of activity.  You may take a shower or bath, and you may eat normally.  You should continue your current exercises and/or therapy routine.  Medications:  Continue your current medications as prescribed.  If your pain decreases, you may reduce the amount of your pain medicines.  If you stopped taking anticoagulants or blood-thinners before the injection, start them tomorrow.  If you have diabetes, your blood sugar may be elevated for a few days.  Call your primary doctor with any questions.  Call Dr. Cameron at [phone] if you experience:  Fever (101 degrees Fahrenheit or greater)  Nausea or vomiting  Headache unrelieved by your normal pain medicine  Redness or swelling at the injection site that lasts more than 1 day  New numbness,

## 2024-01-09 NOTE — OP NOTE
Operative Note      Patient: Hayden Palomino II  YOB: 1970  MRN: [id number]    Date of Procedure: 1/9/2024  Facet Medial Branch Block Injection Operative Report    Indications: This is a 53 y.o. male who presents with LBP. He was positive for LS DJD.  The patient was admitted for diagnostic procedure as conservative measures have failed.      Preoperative Diagnosis: Lumbar Spondylosis    Postoperative Diagnosis: Lumbar Spondylosis    Surgeon(s) and Role:     * Marco Antonio Cameron MD - Primary     Procedure:  Procedure(s):  BILATERAL L4 - L5 AND L5 - S1 MEDIAL BRANCH BLOCK    Procedure in Detail:  After appropriate informed consent was obtained, the patient was taken to the operating suite and placed in the prone position on the operating table on appropriate padding.  The LS region was prepped and draped in the usual sterile fashion. Intraoperative fluoroscopy was used to localize the LS spine. The skin was infiltrated with 2% lidocaine. A 25-g needle was advanced into the Mateus L4-5 and L5-S1 Medial Branches under fluoroscopic guidance. Contrast was injected to confirm needle placement location. Permanent fluoroscopic images were saved in the patient's record.    Next, 4ml of 0.5% marcaine and 20mg of Dexamethasone were injected divided equally between locations. The needle was removed from the patient. The patient was then turned back into the supine position on the stretcher and was taken to the Recovery Room in stable condition.    Estimated Blood Loss:  none     Specimens: None       Drains: None          Complications:  None    Signed By: Marco Antonio Cameron MD                        January 9, 2024          Electronically signed by Marco Antonio Cameron MD on 1/9/2024 at 9:03 AM

## 2024-01-09 NOTE — PERIOP NOTE
Hayden Palomino II  1970  [id number]    Situation:  Verbal report given from: Shannon VICTORIA  Procedure: Procedure(s):  BILATERAL L4 - L5 AND L5 - S1 MEDIAL BRANCH BLOCK    Background:    Preoperative diagnosis: Lumbar spondylosis [M47.816]    Postoperative diagnosis: * No post-op diagnosis entered *    :  Dr. Cameron    Assessment:  Intra-procedure medications       Vital signs stable      Recommendation:    Discharged to home after instructions reviewed with patient. Recommend rest until local anesthetic has worn off.

## 2024-01-09 NOTE — H&P
Procedural Case Note    1/9/2024    (8:23 AM)    Hayden Palomino II    1970   (53 y.o.)    [id number]    CC:  pain    ROS:   Complete ROS obtained, no CP, no SOB, no N or V    PMH:     Past Medical History:   Diagnosis Date    Alcohol abuse 10/02/2017    Annual physical exam 10/02/2017    Anxiety     Arthritis     Back pain 10/02/2017    Basal cell carcinoma 10/02/2017    Cervicalgia 06/13/2023    Depression, acute 10/02/2017    MAHENDRA (generalized anxiety disorder) 10/02/2017    Mood disorder ([facility])     on topiramate, hx of alcoholism    Right anterior shoulder pain 10/02/2017    Right cervical radiculopathy 10/02/2017    Sleep disorder 10/02/2017    Torn meniscus 10/02/2017    Varicocele 10/02/2017       ALLERGIES:   No Known Allergies    MEDS:     Current Facility-Administered Medications   Medication Dose Route Frequency    iohexol (OMNIPAQUE 180) injection 10 mL  10 mL Intra-artICUlar Once    dexAMETHasone (DECADRON) injection 20 mg  20 mg Intra-artICUlar Once    BUPivacaine (MARCAINE) 0.5 % injection 50 mg  10 mL SubCUTAneous Once    lidocaine PF 2 % injection 10 mL  10 mL SubCUTAneous Once          Vitals:    01/09/24 0754   BP: (!) 146/74   Pulse: 86   Resp: 18   Temp: 97.9 °F (36.6 °C)   SpO2: 96%     PE:  Gen: NAD  Head: normocephalic  Heart: RRR  Lungs: CTA jade  Abd: NT, ND, soft  Neuro: awake and alert  Skin: intact    IMPRESSION:   Lumbar spondylosis    Note:  The clinical status was discussed in detail with the patient.  The procedure was again discussed and described in detail.  All understand and accept the planned procedure and risks; reject other forms of treatment.  All questions are answered.    Marco Antonio Cameron MD

## 2024-01-09 NOTE — PERIOP NOTE
Patient received to PACU, VSS. Patient awake and alert with no complaints of pain. Injection site intact.     Neuro:  Push/Pull assessment:     LUE Response: strong   LLE Response: strong   RUE Response: strong   RLE Response: strong    Discharge instructions given. Patient verbalized understanding of instructions and follow up.     Patient states ready for discharge - patient discharged at this time by wheelchair with all belongings. Daughter to provide transportation home.

## 2024-04-17 DIAGNOSIS — F41.1 GAD (GENERALIZED ANXIETY DISORDER): Primary | ICD-10-CM

## 2024-04-17 RX ORDER — ALPRAZOLAM 0.25 MG/1
0.25 TABLET ORAL 3 TIMES DAILY PRN
Qty: 30 TABLET | Refills: 2 | Status: SHIPPED | OUTPATIENT
Start: 2024-04-17 | End: 2024-05-17

## 2024-04-17 NOTE — TELEPHONE ENCOUNTER
PCP: SHAHEEN Tang MD    Last appt: 6/13/2023  Future Appointments   Date Time Provider Department Center   6/18/2024  9:30 AM SHAHEEN Tang MD [facility] BS AMB       Requested Prescriptions     Pending Prescriptions Disp Refills    ALPRAZolam (XANAX) 0.25 MG tablet 30 tablet 2     Sig: Take 1 tablet by mouth 3 times daily as needed for Anxiety for up to 30 days. Max Daily Amount: 0.75 mg       Prior labs and Blood pressures:  BP Readings from Last 3 Encounters:   01/09/24 (!) 148/100   12/22/23 117/85   06/13/23 138/84     Lab Results   Component Value Date/Time     06/13/2023 10:11 AM    K 3.9 06/13/2023 10:11 AM     06/13/2023 10:11 AM    CO2 27 06/13/2023 10:11 AM    BUN 17 06/13/2023 10:11 AM    GFRAA >60 06/04/2021 10:25 AM     No results found for: \"HBA1C\", \"WLI1CICN\"  Lab Results   Component Value Date/Time    CHOL 188 06/13/2023 10:11 AM    HDL 67 06/13/2023 10:11 AM    VLDL 35 06/08/2022 10:27 AM     No results found for: \"VITD3\", \"VD3RIA\"        No results found for: \"TSH\", \"TSH2\", \"TSH3\"

## 2024-06-27 ENCOUNTER — PATIENT MESSAGE (OUTPATIENT)
Facility: CLINIC | Age: 54
End: 2024-06-27

## 2024-06-27 DIAGNOSIS — F41.1 GAD (GENERALIZED ANXIETY DISORDER): ICD-10-CM

## 2024-06-27 RX ORDER — ALPRAZOLAM 0.25 MG/1
0.25 TABLET ORAL 3 TIMES DAILY PRN
Qty: 30 TABLET | Refills: 2 | Status: SHIPPED | OUTPATIENT
Start: 2024-06-27 | End: 2024-07-27

## 2024-06-27 NOTE — TELEPHONE ENCOUNTER
PCP: SHAHEEN Tang MD    Last appt: 6/13/2023    Future Appointments   Date Time Provider Department Center   7/11/2024  1:15 PM SHAHEEN Tang MD [facility] BS AMB       Requested Prescriptions     Pending Prescriptions Disp Refills    ALPRAZolam (XANAX) 0.25 MG tablet 30 tablet 2     Sig: Take 1 tablet by mouth 3 times daily as needed for Anxiety for up to 30 days. Max Daily Amount: 0.75 mg

## 2024-06-27 NOTE — TELEPHONE ENCOUNTER
From: Hayden Palomino II  To: Dr. SHAHEEN Tang  Sent: 6/27/2024 8:47 AM EDT  Subject: Refill    Good morning, would like to request a refill on the Xanax. Thank you for your time.

## 2024-07-11 ENCOUNTER — OFFICE VISIT (OUTPATIENT)
Facility: CLINIC | Age: 54
End: 2024-07-11
Payer: MEDICAID

## 2024-07-11 VITALS
TEMPERATURE: 98.1 F | HEART RATE: 68 BPM | HEIGHT: 74 IN | BODY MASS INDEX: 23.28 KG/M2 | DIASTOLIC BLOOD PRESSURE: 74 MMHG | WEIGHT: 181.4 LBS | SYSTOLIC BLOOD PRESSURE: 138 MMHG | OXYGEN SATURATION: 99 % | RESPIRATION RATE: 16 BRPM

## 2024-07-11 DIAGNOSIS — Z00.00 ANNUAL PHYSICAL EXAM: Primary | ICD-10-CM

## 2024-07-11 PROCEDURE — 99396 PREV VISIT EST AGE 40-64: CPT | Performed by: INTERNAL MEDICINE

## 2024-07-11 SDOH — ECONOMIC STABILITY: FOOD INSECURITY: WITHIN THE PAST 12 MONTHS, YOU WORRIED THAT YOUR FOOD WOULD RUN OUT BEFORE YOU GOT MONEY TO BUY MORE.: NEVER TRUE

## 2024-07-11 SDOH — ECONOMIC STABILITY: HOUSING INSECURITY
IN THE LAST 12 MONTHS, WAS THERE A TIME WHEN YOU DID NOT HAVE A STEADY PLACE TO SLEEP OR SLEPT IN A SHELTER (INCLUDING NOW)?: NO

## 2024-07-11 SDOH — ECONOMIC STABILITY: FOOD INSECURITY: WITHIN THE PAST 12 MONTHS, THE FOOD YOU BOUGHT JUST DIDN'T LAST AND YOU DIDN'T HAVE MONEY TO GET MORE.: NEVER TRUE

## 2024-07-11 SDOH — ECONOMIC STABILITY: INCOME INSECURITY: HOW HARD IS IT FOR YOU TO PAY FOR THE VERY BASICS LIKE FOOD, HOUSING, MEDICAL CARE, AND HEATING?: NOT HARD AT ALL

## 2024-07-11 ASSESSMENT — PATIENT HEALTH QUESTIONNAIRE - PHQ9
SUM OF ALL RESPONSES TO PHQ9 QUESTIONS 1 & 2: 0
SUM OF ALL RESPONSES TO PHQ QUESTIONS 1-9: 0
4. FEELING TIRED OR HAVING LITTLE ENERGY: NOT AT ALL
8. MOVING OR SPEAKING SO SLOWLY THAT OTHER PEOPLE COULD HAVE NOTICED. OR THE OPPOSITE, BEING SO FIGETY OR RESTLESS THAT YOU HAVE BEEN MOVING AROUND A LOT MORE THAN USUAL: NOT AT ALL
9. THOUGHTS THAT YOU WOULD BE BETTER OFF DEAD, OR OF HURTING YOURSELF: NOT AT ALL
6. FEELING BAD ABOUT YOURSELF - OR THAT YOU ARE A FAILURE OR HAVE LET YOURSELF OR YOUR FAMILY DOWN: NOT AT ALL
10. IF YOU CHECKED OFF ANY PROBLEMS, HOW DIFFICULT HAVE THESE PROBLEMS MADE IT FOR YOU TO DO YOUR WORK, TAKE CARE OF THINGS AT HOME, OR GET ALONG WITH OTHER PEOPLE: NOT DIFFICULT AT ALL
SUM OF ALL RESPONSES TO PHQ QUESTIONS 1-9: 0
7. TROUBLE CONCENTRATING ON THINGS, SUCH AS READING THE NEWSPAPER OR WATCHING TELEVISION: NOT AT ALL
SUM OF ALL RESPONSES TO PHQ QUESTIONS 1-9: 0
1. LITTLE INTEREST OR PLEASURE IN DOING THINGS: NOT AT ALL
3. TROUBLE FALLING OR STAYING ASLEEP: NOT AT ALL
2. FEELING DOWN, DEPRESSED OR HOPELESS: NOT AT ALL
5. POOR APPETITE OR OVEREATING: NOT AT ALL
SUM OF ALL RESPONSES TO PHQ QUESTIONS 1-9: 0

## 2024-07-11 NOTE — PROGRESS NOTES
Hayden Palomino II is a 54 y.o. male     Chief Complaint   Patient presents with    Annual Exam       /74 (Site: Left Upper Arm, Position: Sitting, Cuff Size: Medium Adult)   Pulse 68   Temp 98.1 °F (36.7 °C) (Oral)   Resp 16   Ht 1.88 m (6' 2\")   Wt 82.3 kg (181 lb 6.4 oz)   SpO2 99%   BMI 23.29 kg/m²     Health Maintenance Due   Topic Date Due    Hepatitis B vaccine (1 of 3 - 3-dose series) Never done    Depression Monitoring  Never done    HIV screen  Never done    DTaP/Tdap/Td vaccine (1 - Tdap) Never done    Shingles vaccine (1 of 2) Never done    COVID-19 Vaccine (2 - 2023-24 season) 09/01/2023         \"Have you been to the ER, urgent care clinic since your last visit?  Hospitalized since your last visit?\"    NO    “Have you seen or consulted any other health care providers outside of [facility] since your last visit?”    NO

## 2024-07-11 NOTE — PROGRESS NOTES
Hayden Palomino II is a 54 y.o. male and presents with Annual Exam  .    Subjective:  Mr. Palomino presents today for complete physical exam.  His history is significant for alcohol use, generalized anxiety disorder.  He has been abstinent from alcohol for 4 years now.  He takes alprazolam on an as-needed basis for anxiety.  He is basically using CBD which has helped him tremendously with his anxiety.  He has no shortness of breath, chest pain, palpitations, PND, orthopnea, or pedal edema.  His daughter is now 20 years old and a rising sophomore at [employer] and doing well.  His mother and father are both elderly and his mother is more easily agitated which has been a stressful situation for him as he is living with his parents.  Past Medical History:   Diagnosis Date    Alcohol abuse 10/02/2017    Annual physical exam 10/02/2017    Anxiety     Arthritis     Back pain 10/02/2017    Basal cell carcinoma 10/02/2017    Cervicalgia 06/13/2023    Depression, acute 10/02/2017    MAHENDRA (generalized anxiety disorder) 10/02/2017    Mood disorder (HCC)     on topiramate, hx of alcoholism    Right anterior shoulder pain 10/02/2017    Right cervical radiculopathy 10/02/2017    Sleep disorder 10/02/2017    Torn meniscus 10/02/2017    Varicocele 10/02/2017     Past Surgical History:   Procedure Laterality Date    COLONOSCOPY N/A 12/22/2023    COLONOSCOPY/BIOPSY/POLYP performed by Isaiah Edwards MD at [facility] ENDOSCOPY    HERNIA REPAIR  2006    HERNIA REPAIR  1971    Dr. Lopez    ORTHOPEDIC SURGERY  2004    Disc Fusion C5-C6 (Dr. Bi Gautam, [facility])    OTHER SURGICAL HISTORY      lumbar ablation    PAIN MANAGEMENT PROCEDURE Bilateral 1/9/2024    BILATERAL L4 - L5 AND L5 - S1 MEDIAL BRANCH BLOCK performed by Marco Antonio Cameron MD at [facility] AMBULATORY OR     No Known Allergies  Current Outpatient Medications   Medication Sig Dispense Refill    ALPRAZolam (XANAX) 0.25 MG tablet Take 1 tablet by mouth 3 times daily as needed for

## 2024-07-12 LAB
ALBUMIN SERPL-MCNC: 4.9 G/DL (ref 3.8–4.9)
ALP SERPL-CCNC: 49 IU/L (ref 44–121)
ALT SERPL-CCNC: 15 IU/L (ref 0–44)
APPEARANCE UR: CLEAR
AST SERPL-CCNC: 11 IU/L (ref 0–40)
BASOPHILS # BLD AUTO: 0.1 X10E3/UL (ref 0–0.2)
BASOPHILS NFR BLD AUTO: 1 %
BILIRUB SERPL-MCNC: 1 MG/DL (ref 0–1.2)
BILIRUB UR QL STRIP: NEGATIVE
BUN SERPL-MCNC: 13 MG/DL (ref 6–24)
BUN/CREAT SERPL: 12 (ref 9–20)
CALCIUM SERPL-MCNC: 9.3 MG/DL (ref 8.7–10.2)
CHLORIDE SERPL-SCNC: 104 MMOL/L (ref 96–106)
CHOLEST SERPL-MCNC: 180 MG/DL (ref 100–199)
CO2 SERPL-SCNC: 25 MMOL/L (ref 20–29)
COLOR UR: YELLOW
CREAT SERPL-MCNC: 1.05 MG/DL (ref 0.76–1.27)
EGFRCR SERPLBLD CKD-EPI 2021: 85 ML/MIN/1.73
EOSINOPHIL # BLD AUTO: 0.3 X10E3/UL (ref 0–0.4)
EOSINOPHIL NFR BLD AUTO: 3 %
ERYTHROCYTE [DISTWIDTH] IN BLOOD BY AUTOMATED COUNT: 13.4 % (ref 11.6–15.4)
GLOBULIN SER CALC-MCNC: 2.1 G/DL (ref 1.5–4.5)
GLUCOSE SERPL-MCNC: 93 MG/DL (ref 70–99)
GLUCOSE UR QL STRIP: NEGATIVE
HBA1C MFR BLD: 5.8 % (ref 4.8–5.6)
HCT VFR BLD AUTO: 40.5 % (ref 37.5–51)
HDLC SERPL-MCNC: 58 MG/DL
HGB BLD-MCNC: 13.2 G/DL (ref 13–17.7)
HGB UR QL STRIP: NEGATIVE
IMM GRANULOCYTES # BLD AUTO: 0 X10E3/UL (ref 0–0.1)
IMM GRANULOCYTES NFR BLD AUTO: 0 %
KETONES UR QL STRIP: NEGATIVE
LDLC SERPL CALC-MCNC: 112 MG/DL (ref 0–99)
LEUKOCYTE ESTERASE UR QL STRIP: NEGATIVE
LYMPHOCYTES # BLD AUTO: 2.3 X10E3/UL (ref 0.7–3.1)
LYMPHOCYTES NFR BLD AUTO: 28 %
MCH RBC QN AUTO: 28.1 PG (ref 26.6–33)
MCHC RBC AUTO-ENTMCNC: 32.6 G/DL (ref 31.5–35.7)
MCV RBC AUTO: 86 FL (ref 79–97)
MONOCYTES # BLD AUTO: 0.5 X10E3/UL (ref 0.1–0.9)
MONOCYTES NFR BLD AUTO: 5 %
NEUTROPHILS # BLD AUTO: 5.3 X10E3/UL (ref 1.4–7)
NEUTROPHILS NFR BLD AUTO: 63 %
NITRITE UR QL STRIP: NEGATIVE
PH UR STRIP: 6.5 [PH] (ref 5–7.5)
PLATELET # BLD AUTO: 283 X10E3/UL (ref 150–450)
POTASSIUM SERPL-SCNC: 4.2 MMOL/L (ref 3.5–5.2)
PROT SERPL-MCNC: 7 G/DL (ref 6–8.5)
PROT UR QL STRIP: NEGATIVE
PSA SERPL-MCNC: 0.6 NG/ML (ref 0–4)
RBC # BLD AUTO: 4.69 X10E6/UL (ref 4.14–5.8)
SODIUM SERPL-SCNC: 142 MMOL/L (ref 134–144)
SP GR UR STRIP: <=1.005 (ref 1–1.03)
TRIGL SERPL-MCNC: 53 MG/DL (ref 0–149)
UROBILINOGEN UR STRIP-MCNC: 0.2 MG/DL (ref 0.2–1)
VLDLC SERPL CALC-MCNC: 10 MG/DL (ref 5–40)
WBC # BLD AUTO: 8.4 X10E3/UL (ref 3.4–10.8)

## 2024-08-12 NOTE — DISCHARGE SUMMARY
----- Message from Raimundo Boateng RN sent at 8/9/2024  9:18 AM CDT -----  Regarding: September 16    ----- Message -----  From: Raimundo Boateng RN  Sent: 8/8/2024   1:47 PM CDT  To: [facility] Nurse Msg Pool  Subject: September                                        Follow up after 9/19 s/p Ablation on 8/8.       Spine Discharge Summary    Patient ID:  Fatemeh Lainez  [id number]  male  56 y.o.  1970    Admit date: 10/10/2017    Discharge date: 10/11/2017    Admitting Physician: Nicole Contreras MD     Consulting Physician(s):   Treatment Team: Attending Provider: Nicole Contreras MD; Utilization Review: Maycol Resendiz    Date of Surgery:   10/10/2017     Preoperative Diagnosis:  CERVICALGIA, HNP, ARTHRODESIS    Postoperative Diagnosis:   CERVICALGIA, HNP, ARTHRODESIS    Procedure(s):  C5-6 REVISION C6-7 ACDF     Anesthesia Type:   General     Surgeon: Nicole Contreras MD                            HPI:  Pt is a 56 y.o. male who has a history of CERVICALGIA, HNP, ARTHRODESIS  with pain and limitations of activities of daily living who presents at this time for a C5-C6 Revision and C6-C7 ACDF following the failure of conservative management. PMH:   Past Medical History:   Diagnosis Date    Alcohol abuse 10/2/2017    Annual physical exam 10/2/2017    Anxiety     Back pain 10/2/2017    Basal cell carcinoma 10/2/2017    Depression, acute 10/2/2017    ISABEL (generalized anxiety disorder) 10/2/2017    Mood disorder ([address]     on topiramate, hx of alcoholism    Right anterior shoulder pain 10/2/2017    Right cervical radiculopathy 10/2/2017    Sleep disorder 10/2/2017    Torn meniscus 10/2/2017    Varicocele 10/2/2017       Body mass index is 28.9 kg/(m^2). BMI greater than 30 is classified as obesity. Medications upon admission :   Prior to Admission Medications   Prescriptions Last Dose Informant Patient Reported? Taking?   hydrOXYzine pamoate (VISTARIL) 50 mg capsule 10/9/2017 at 1800  Yes Yes   Sig: Take 25 mg by mouth three (3) times daily as needed for Itching or Anxiety. topiramate (TOPAMAX) 50 mg tablet 10/8/2017  No No   Sig: Take 1 Tab by mouth nightly. traZODone (DESYREL) 100 mg tablet 10/8/2017  No No   Sig: Take 1 Tab by mouth nightly.       Facility-Administered Medications: None Allergies:  No Known Allergies     Hospital Course: The patient underwent surgery. Complications:  None; patient tolerated the procedure well. Was taken to the PACU in stable condition and then transferred to the ortho floor. Patient had some difficulty voiding initially. Was started on Flomax on POD #1 and had improvement in his retention and hesitancy. He was able to discharge to home with one week of Flomax prescribed. Perioperative Antibiotics:  Ancef     Postoperative Pain Management:  Oxycodone     Postoperative transfusions:    Number of units banked PRBCs =   none     Post Op complications: none    Hemoglobin at discharge:    Lab Results   Component Value Date/Time    HGB 12.8 09/27/2017 10:24 AM    INR 1.1 09/27/2017 10:24 AM       Dressing  - clean, dry and intact. No significant erythema or swelling. Neurovascular exam found to be within normal limits. Wound appears to be healing without any evidence of infection. Patient mobilized with nursing and was found to be safe and steady with ambulation. Discharged to: Home    Condition on Discharge:   stable    Discharge instructions:    - Take pain medications as prescribed  - Resume pre hospital diet      - Discharge activity: activity as tolerated  - Ambulate as tolerated  - Wound Care Keep wound clean and dry. See discharge instruction sheet.  - Staples, if present, to be removed 10-14 days after surgery            -DISCHARGE MEDICATION LIST     Current Discharge Medication List      START taking these medications    Details   acetaminophen (TYLENOL) 500 mg tablet Take 2 Tabs by mouth every six (6) hours for 14 days. Qty: 112 Tab, Refills: 0      diazePAM (VALIUM) 5 mg tablet Take 1 Tab by mouth every six (6) hours as needed. Max Daily Amount: 20 mg.  Qty: 20 Tab, Refills: 0      oxyCODONE IR (ROXICODONE) 5 mg immediate release tablet Take 1-2 Tabs by mouth every three (3) hours as needed.  Max Daily Amount: 80 mg.  Qty: 80 Tab, Refills: 0      tamsulosin (FLOMAX) 0.4 mg capsule Take 1 Cap by mouth daily for 7 days. Begin tonight 10/11 and take in the evening. Qty: 7 Cap, Refills: 0      polyethylene glycol (MIRALAX) 17 gram/dose powder Take 17 g by mouth daily for 15 days. Qty: 255 g, Refills: 0      senna-docusate (SENNA PLUS) 8.6-50 mg per tablet Take 1 Tab by mouth two (2) times a day. Qty: 60 Tab, Refills: 0      naloxone (NARCAN) 4 mg/actuation nasal spray 1 [place] by IntraNASal route as needed for Other (Respiratory depression). Give single spray into one nostril. Call 911. Give doses every 2 to 3 minutes, alternating nostrils, until assistance arrives using a new nasal spray with each dose, if patient does not respond or responds and then relapses  Qty: 1 Each, Refills: 0         CONTINUE these medications which have NOT CHANGED    Details   hydrOXYzine pamoate (VISTARIL) 50 mg capsule Take 25 mg by mouth three (3) times daily as needed for Itching or Anxiety. topiramate (TOPAMAX) 50 mg tablet Take 1 Tab by mouth nightly. Qty: 30 Tab, Refills: 3      traZODone (DESYREL) 100 mg tablet Take 1 Tab by mouth nightly.   Qty: 30 Tab, Refills: 3          per medical continuation form      -Follow up in office in 2 weeks      Signed:  Alfredo Mojica  MSN, APRN, NP-C, [facility]  Orthopaedic Nurse Practitioner    10/11/2017  1:45 PM

## 2024-08-15 DIAGNOSIS — F41.1 GAD (GENERALIZED ANXIETY DISORDER): Primary | ICD-10-CM

## 2024-08-15 RX ORDER — ALPRAZOLAM 0.25 MG/1
0.25 TABLET ORAL 3 TIMES DAILY PRN
Qty: 30 TABLET | Refills: 1 | Status: SHIPPED | OUTPATIENT
Start: 2024-08-15 | End: 2024-09-14

## 2024-08-15 NOTE — TELEPHONE ENCOUNTER
PCP: SHAHEEN Tang MD    Last appt: 7/11/2024  Future Appointments   Date Time Provider Department Center   7/17/2025  1:00 PM SHAHEEN Tang MD [facility]       Requested Prescriptions     Pending Prescriptions Disp Refills    ALPRAZolam (XANAX) 0.25 MG tablet 30 tablet 1     Sig: Take 1 tablet by mouth 3 times daily as needed for Anxiety for up to 30 days. Max Daily Amount: 0.75 mg       Prior labs and Blood pressures:  BP Readings from Last 3 Encounters:   07/11/24 138/74   01/09/24 (!) 148/100   12/22/23 117/85     Lab Results   Component Value Date/Time     07/11/2024 01:57 PM    K 4.2 07/11/2024 01:57 PM     07/11/2024 01:57 PM    CO2 25 07/11/2024 01:57 PM    BUN 13 07/11/2024 01:57 PM    GFRAA >60 06/04/2021 10:25 AM     No results found for: \"HBA1C\", \"KXF4RCSN\"  Lab Results   Component Value Date/Time    CHOL 180 07/11/2024 01:57 PM    HDL 58 07/11/2024 01:57 PM     07/11/2024 01:57 PM    .2 06/13/2023 10:11 AM    VLDL 10 07/11/2024 01:57 PM    VLDL 35 06/08/2022 10:27 AM     No results found for: \"VITD3\"        No results found for: \"TSH\", \"TSH2\", \"TSH3\"

## 2024-09-20 DIAGNOSIS — F41.1 GAD (GENERALIZED ANXIETY DISORDER): Primary | ICD-10-CM

## 2024-09-24 RX ORDER — ALPRAZOLAM 0.25 MG
0.25 TABLET ORAL NIGHTLY PRN
Qty: 30 TABLET | Refills: 2 | Status: SHIPPED | OUTPATIENT
Start: 2024-09-24 | End: 2024-12-23

## 2024-09-24 RX ORDER — TIZANIDINE 2 MG/1
2 TABLET ORAL EVERY 6 HOURS PRN
Qty: 30 TABLET | Refills: 0 | Status: SHIPPED | OUTPATIENT
Start: 2024-09-24

## 2024-10-29 DIAGNOSIS — F41.1 GAD (GENERALIZED ANXIETY DISORDER): ICD-10-CM

## 2024-10-31 RX ORDER — ALPRAZOLAM 0.25 MG/1
0.25 TABLET ORAL NIGHTLY PRN
Qty: 30 TABLET | Refills: 2 | Status: SHIPPED | OUTPATIENT
Start: 2024-10-31 | End: 2025-01-29

## 2024-10-31 RX ORDER — TIZANIDINE 2 MG/1
2 TABLET ORAL EVERY 6 HOURS PRN
Qty: 30 TABLET | Refills: 0 | Status: SHIPPED | OUTPATIENT
Start: 2024-10-31

## 2024-10-31 NOTE — TELEPHONE ENCOUNTER
PCP: SHAHEEN Tang MD    Last appt: 7/11/2024    Future Appointments   Date Time Provider Department Center   7/17/2025  1:00 PM SHAHEEN Tang MD [facility]       Requested Prescriptions     Pending Prescriptions Disp Refills    tiZANidine (ZANAFLEX) 2 MG tablet 30 tablet 0     Sig: Take 1 tablet by mouth every 6 hours as needed (muscle spasms)    ALPRAZolam (XANAX) 0.25 MG tablet 30 tablet 2     Sig: Take 1 tablet by mouth nightly as needed for Anxiety for up to 90 days. Max Daily Amount: 0.25 mg

## 2024-12-03 DIAGNOSIS — F41.1 GAD (GENERALIZED ANXIETY DISORDER): ICD-10-CM

## 2024-12-04 RX ORDER — TIZANIDINE 2 MG/1
2 TABLET ORAL EVERY 6 HOURS PRN
Qty: 30 TABLET | Refills: 0 | Status: SHIPPED | OUTPATIENT
Start: 2024-12-04

## 2024-12-04 RX ORDER — ALPRAZOLAM 0.5 MG
0.5 TABLET ORAL NIGHTLY PRN
Qty: 30 TABLET | Refills: 2 | Status: SHIPPED | OUTPATIENT
Start: 2024-12-04 | End: 2025-03-04

## 2024-12-04 NOTE — TELEPHONE ENCOUNTER
PCP: SHAHEEN Tang MD    Last appt: 7/11/2024    Future Appointments   Date Time Provider Department Center   7/17/2025  1:00 PM SHAHEEN Tang MD [facility]       Requested Prescriptions     Pending Prescriptions Disp Refills    tiZANidine (ZANAFLEX) 2 MG tablet 30 tablet 0     Sig: Take 1 tablet by mouth every 6 hours as needed (muscle spasms)    ALPRAZolam (XANAX) 0.25 MG tablet 30 tablet 2     Sig: Take 1 tablet by mouth nightly as needed for Anxiety for up to 90 days. Max Daily Amount: 0.25 mg

## 2025-01-09 DIAGNOSIS — F41.1 GAD (GENERALIZED ANXIETY DISORDER): ICD-10-CM

## 2025-01-10 RX ORDER — TIZANIDINE 2 MG/1
2 TABLET ORAL EVERY 6 HOURS PRN
Qty: 30 TABLET | Refills: 0 | Status: SHIPPED | OUTPATIENT
Start: 2025-01-10

## 2025-01-10 RX ORDER — ALPRAZOLAM 0.5 MG
0.5 TABLET ORAL NIGHTLY PRN
Qty: 30 TABLET | Refills: 2 | Status: SHIPPED | OUTPATIENT
Start: 2025-01-10 | End: 2025-04-10

## 2025-02-10 DIAGNOSIS — F41.1 GAD (GENERALIZED ANXIETY DISORDER): ICD-10-CM

## 2025-02-10 DIAGNOSIS — M54.12 BRACHIAL NEURITIS: Primary | ICD-10-CM

## 2025-02-10 RX ORDER — TIZANIDINE 2 MG/1
2 TABLET ORAL EVERY 6 HOURS PRN
Qty: 30 TABLET | Refills: 0 | Status: SHIPPED | OUTPATIENT
Start: 2025-02-10

## 2025-02-10 RX ORDER — ALPRAZOLAM 0.5 MG
0.5 TABLET ORAL NIGHTLY PRN
Qty: 30 TABLET | Refills: 2 | Status: SHIPPED | OUTPATIENT
Start: 2025-02-10 | End: 2025-05-11

## 2025-02-10 NOTE — TELEPHONE ENCOUNTER
RX refill request from the patient/pharmacy. Patient last seen 07/11/2024 with labs, and next appt. scheduled for 07/17/2025.    Requested Prescriptions     Pending Prescriptions Disp Refills    tiZANidine (ZANAFLEX) 2 MG tablet 30 tablet 0     Sig: Take 1 tablet by mouth every 6 hours as needed (muscle spasms)    ALPRAZolam (XANAX) 0.5 MG tablet 30 tablet 2     Sig: Take 1 tablet by mouth nightly as needed for Anxiety for up to 90 days. Max Daily Amount: 0.5 mg

## 2025-02-18 DIAGNOSIS — M54.2 CERVICALGIA: Primary | ICD-10-CM

## 2025-02-18 DIAGNOSIS — M54.12 BRACHIAL NEURITIS: ICD-10-CM

## 2025-02-18 RX ORDER — CYCLOBENZAPRINE HCL 5 MG
5 TABLET ORAL 2 TIMES DAILY PRN
Qty: 60 TABLET | Refills: 0 | Status: SHIPPED | OUTPATIENT
Start: 2025-02-18 | End: 2025-03-20

## 2025-02-18 RX ORDER — TIZANIDINE 2 MG/1
2 TABLET ORAL EVERY 6 HOURS PRN
Qty: 30 TABLET | Refills: 0 | OUTPATIENT
Start: 2025-02-18

## 2025-02-18 NOTE — TELEPHONE ENCOUNTER
PCP: SHAHEEN Tang MD    Last appt: 7/11/2024    Future Appointments   Date Time Provider Department Center   7/17/2025  1:00 PM SHAHEEN Tang MD [facility]       Requested Prescriptions     Pending Prescriptions Disp Refills    tiZANidine (ZANAFLEX) 2 MG tablet 30 tablet 0     Sig: Take 1 tablet by mouth every 6 hours as needed (muscle spasms)

## 2025-03-26 DIAGNOSIS — F41.1 GAD (GENERALIZED ANXIETY DISORDER): ICD-10-CM

## 2025-03-27 NOTE — TELEPHONE ENCOUNTER
PCP: SHAHEEN Tang MD    Last appt: 7/11/2024    Future Appointments   Date Time Provider Department Center   7/17/2025  1:00 PM SHAHEEN Tang MD [facility]       Requested Prescriptions     Pending Prescriptions Disp Refills    ALPRAZolam (XANAX) 0.5 MG tablet 30 tablet 2     Sig: Take 1 tablet by mouth nightly as needed for Anxiety for up to 90 days. Max Daily Amount: 0.5 mg

## 2025-03-28 RX ORDER — ALPRAZOLAM 0.5 MG
0.5 TABLET ORAL NIGHTLY PRN
Qty: 30 TABLET | Refills: 2 | Status: SHIPPED | OUTPATIENT
Start: 2025-03-28 | End: 2025-06-26

## 2025-04-01 ENCOUNTER — TELEPHONE (OUTPATIENT)
Facility: CLINIC | Age: 55
End: 2025-04-01

## 2025-04-28 DIAGNOSIS — F41.1 GAD (GENERALIZED ANXIETY DISORDER): ICD-10-CM

## 2025-04-28 NOTE — TELEPHONE ENCOUNTER
PCP: SHAHEEN Tang MD    Last appt: 7/11/2024    Future Appointments   Date Time Provider Department Center   7/17/2025  1:00 PM SHAHEEN Tang MD [facility] DEP       Requested Prescriptions     Pending Prescriptions Disp Refills    ALPRAZolam (XANAX) 0.5 MG tablet 30 tablet 2     Sig: Take 1 tablet by mouth nightly as needed for Anxiety for up to 90 days. Max Daily Amount: 0.5 mg

## 2025-04-29 RX ORDER — ALPRAZOLAM 0.5 MG
0.5 TABLET ORAL NIGHTLY PRN
Qty: 30 TABLET | Refills: 2 | Status: SHIPPED | OUTPATIENT
Start: 2025-04-29 | End: 2025-07-28

## 2025-06-02 DIAGNOSIS — M54.12 BRACHIAL NEURITIS: ICD-10-CM

## 2025-06-02 DIAGNOSIS — F41.1 GAD (GENERALIZED ANXIETY DISORDER): ICD-10-CM

## 2025-06-02 RX ORDER — ALPRAZOLAM 0.5 MG
0.5 TABLET ORAL NIGHTLY PRN
Qty: 30 TABLET | Refills: 2 | Status: SHIPPED | OUTPATIENT
Start: 2025-06-02 | End: 2025-08-31

## 2025-06-02 RX ORDER — TIZANIDINE 2 MG/1
2 TABLET ORAL EVERY 6 HOURS PRN
Qty: 30 TABLET | Refills: 0 | Status: SHIPPED | OUTPATIENT
Start: 2025-06-02

## 2025-07-02 DIAGNOSIS — M54.12 BRACHIAL NEURITIS: ICD-10-CM

## 2025-07-02 DIAGNOSIS — F41.1 GAD (GENERALIZED ANXIETY DISORDER): ICD-10-CM

## 2025-07-03 RX ORDER — TIZANIDINE 2 MG/1
2 TABLET ORAL EVERY 6 HOURS PRN
Qty: 30 TABLET | Refills: 0 | Status: SHIPPED | OUTPATIENT
Start: 2025-07-03

## 2025-07-03 RX ORDER — ALPRAZOLAM 0.5 MG
0.5 TABLET ORAL NIGHTLY PRN
Qty: 30 TABLET | Refills: 2 | Status: SHIPPED | OUTPATIENT
Start: 2025-07-03 | End: 2025-10-01

## 2025-07-03 NOTE — TELEPHONE ENCOUNTER
PCP: SHAHEEN Tang MD    Last appt: 7/11/2024    Future Appointments   Date Time Provider Department Center   7/30/2025  1:15 PM SHAHEEN Tang MD [facility]       Requested Prescriptions     Pending Prescriptions Disp Refills    tiZANidine (ZANAFLEX) 2 MG tablet 30 tablet 0     Sig: Take 1 tablet by mouth every 6 hours as needed (muscle spasms)    ALPRAZolam (XANAX) 0.5 MG tablet 30 tablet 2     Sig: Take 1 tablet by mouth nightly as needed for Anxiety for up to 90 days. Max Daily Amount: 0.5 mg

## 2025-07-30 ENCOUNTER — OFFICE VISIT (OUTPATIENT)
Facility: CLINIC | Age: 55
End: 2025-07-30
Payer: MEDICAID

## 2025-07-30 VITALS
SYSTOLIC BLOOD PRESSURE: 132 MMHG | WEIGHT: 183.4 LBS | HEIGHT: 74 IN | DIASTOLIC BLOOD PRESSURE: 78 MMHG | TEMPERATURE: 98.9 F | OXYGEN SATURATION: 97 % | BODY MASS INDEX: 23.54 KG/M2 | HEART RATE: 73 BPM | RESPIRATION RATE: 19 BRPM

## 2025-07-30 DIAGNOSIS — Z00.00 ANNUAL PHYSICAL EXAM: Primary | ICD-10-CM

## 2025-07-30 DIAGNOSIS — M54.12 CERVICAL RADICULOPATHY: ICD-10-CM

## 2025-07-30 PROCEDURE — 99396 PREV VISIT EST AGE 40-64: CPT | Performed by: INTERNAL MEDICINE

## 2025-07-30 RX ORDER — GABAPENTIN 300 MG/1
300 CAPSULE ORAL NIGHTLY
Qty: 90 CAPSULE | Refills: 0 | Status: SHIPPED | OUTPATIENT
Start: 2025-07-30 | End: 2025-10-28

## 2025-07-30 SDOH — ECONOMIC STABILITY: FOOD INSECURITY: WITHIN THE PAST 12 MONTHS, YOU WORRIED THAT YOUR FOOD WOULD RUN OUT BEFORE YOU GOT MONEY TO BUY MORE.: NEVER TRUE

## 2025-07-30 SDOH — ECONOMIC STABILITY: FOOD INSECURITY: WITHIN THE PAST 12 MONTHS, THE FOOD YOU BOUGHT JUST DIDN'T LAST AND YOU DIDN'T HAVE MONEY TO GET MORE.: NEVER TRUE

## 2025-07-30 ASSESSMENT — PATIENT HEALTH QUESTIONNAIRE - PHQ9
5. POOR APPETITE OR OVEREATING: NOT AT ALL
4. FEELING TIRED OR HAVING LITTLE ENERGY: NOT AT ALL
3. TROUBLE FALLING OR STAYING ASLEEP: NOT AT ALL
8. MOVING OR SPEAKING SO SLOWLY THAT OTHER PEOPLE COULD HAVE NOTICED. OR THE OPPOSITE, BEING SO FIGETY OR RESTLESS THAT YOU HAVE BEEN MOVING AROUND A LOT MORE THAN USUAL: NOT AT ALL
2. FEELING DOWN, DEPRESSED OR HOPELESS: NOT AT ALL
1. LITTLE INTEREST OR PLEASURE IN DOING THINGS: NOT AT ALL
9. THOUGHTS THAT YOU WOULD BE BETTER OFF DEAD, OR OF HURTING YOURSELF: NOT AT ALL
SUM OF ALL RESPONSES TO PHQ QUESTIONS 1-9: 0
SUM OF ALL RESPONSES TO PHQ QUESTIONS 1-9: 0
10. IF YOU CHECKED OFF ANY PROBLEMS, HOW DIFFICULT HAVE THESE PROBLEMS MADE IT FOR YOU TO DO YOUR WORK, TAKE CARE OF THINGS AT HOME, OR GET ALONG WITH OTHER PEOPLE: NOT DIFFICULT AT ALL
SUM OF ALL RESPONSES TO PHQ QUESTIONS 1-9: 0
7. TROUBLE CONCENTRATING ON THINGS, SUCH AS READING THE NEWSPAPER OR WATCHING TELEVISION: NOT AT ALL
6. FEELING BAD ABOUT YOURSELF - OR THAT YOU ARE A FAILURE OR HAVE LET YOURSELF OR YOUR FAMILY DOWN: NOT AT ALL
SUM OF ALL RESPONSES TO PHQ QUESTIONS 1-9: 0

## 2025-07-30 NOTE — PROGRESS NOTES
Hayden Palomino II is a 55 y.o. male     Chief Complaint   Patient presents with    Annual Exam       BP (!) 142/80 (BP Site: Left Upper Arm, Patient Position: Sitting, BP Cuff Size: Large Adult)   Pulse 73   Temp 98.9 °F (37.2 °C) (Temporal)   Resp 19   Ht 1.88 m (6' 2\")   Wt 83.2 kg (183 lb 6.4 oz)   SpO2 97%   BMI 23.55 kg/m²     Health Maintenance Due   Topic Date Due    HIV screen  Never done    Hepatitis B vaccine (1 of 3 - 19+ 3-dose series) Never done    DTaP/Tdap/Td vaccine (1 - Tdap) Never done    Shingles vaccine (1 of 2) Never done    Pneumococcal 50+ years Vaccine (1 of 1 - PCV) Never done    COVID-19 Vaccine (2 - 2024-25 season) 09/01/2024    A1C test (Diabetic or Prediabetic)  07/11/2025    Depression Monitoring  07/11/2025         \"Have you been to the ER, urgent care clinic since your last visit?  Hospitalized since your last visit?\"    NO    “Have you seen or consulted any other health care providers outside of [facility] since your last visit?”    NO

## 2025-07-30 NOTE — PROGRESS NOTES
Hayden Palomino II is a 55 y.o. male and presents with Annual Exam  .    Subjective:    Mr. Palomino presents today for complete physical exam.  He has noted increasing discomfort radiating into his left shoulder and upper arm.  He has previous history of ACDF of the cervical spine x 2.  He has no loss of  strength in his left upper extremity.  He continues tizanidine as needed and takes alprazolam 0.5 mg 3 times daily as needed for anxiety.  He has no shortness of breath, chest pain, palpitations, PND, orthopnea, or pedal edema.  Past Medical History:   Diagnosis Date    Alcohol abuse 10/02/2017    Annual physical exam 10/02/2017    Anxiety     Arthritis     Back pain 10/02/2017    Basal cell carcinoma 10/02/2017    Cervicalgia 06/13/2023    Depression, acute 10/02/2017    MAHENDRA (generalized anxiety disorder) 10/02/2017    Mood disorder     on topiramate, hx of alcoholism    Right anterior shoulder pain 10/02/2017    Right cervical radiculopathy 10/02/2017    Sleep disorder 10/02/2017    Torn meniscus 10/02/2017    Varicocele 10/02/2017     Past Surgical History:   Procedure Laterality Date    COLONOSCOPY N/A 12/22/2023    COLONOSCOPY/BIOPSY/POLYP performed by Isaiah Edwards MD at [facility] ENDOSCOPY    HERNIA REPAIR  2006    HERNIA REPAIR  1971    Dr. Lopez    ORTHOPEDIC SURGERY  2004    Disc Fusion C5-C6 (Dr. Bi Gautam, [facility])    OTHER SURGICAL HISTORY      lumbar ablation    PAIN MANAGEMENT PROCEDURE Bilateral 1/9/2024    BILATERAL L4 - L5 AND L5 - S1 MEDIAL BRANCH BLOCK performed by Marco Antonio Cameron MD at [facility] AMBULATORY OR     No Known Allergies  Current Outpatient Medications   Medication Sig Dispense Refill    gabapentin (NEURONTIN) 300 MG capsule Take 1 capsule by mouth nightly for 90 days. Max Daily Amount: 300 mg 90 capsule 0    tiZANidine (ZANAFLEX) 2 MG tablet Take 1 tablet by mouth every 6 hours as needed (muscle spasms) 30 tablet 0    ALPRAZolam (XANAX) 0.5 MG tablet Take 1 tablet by

## 2025-07-31 LAB
ALBUMIN SERPL-MCNC: 5.2 G/DL (ref 3.8–4.9)
ALP SERPL-CCNC: 50 IU/L (ref 44–121)
ALT SERPL-CCNC: 13 IU/L (ref 0–44)
APPEARANCE UR: CLEAR
AST SERPL-CCNC: 16 IU/L (ref 0–40)
BASOPHILS # BLD AUTO: 0.1 X10E3/UL (ref 0–0.2)
BASOPHILS NFR BLD AUTO: 1 %
BILIRUB SERPL-MCNC: 1.6 MG/DL (ref 0–1.2)
BILIRUB UR QL STRIP: NEGATIVE
BUN SERPL-MCNC: 16 MG/DL (ref 6–24)
BUN/CREAT SERPL: 16 (ref 9–20)
CALCIUM SERPL-MCNC: 10.2 MG/DL (ref 8.7–10.2)
CHLORIDE SERPL-SCNC: 103 MMOL/L (ref 96–106)
CHOLEST SERPL-MCNC: 200 MG/DL (ref 100–199)
CO2 SERPL-SCNC: 21 MMOL/L (ref 20–29)
COLOR UR: YELLOW
CREAT SERPL-MCNC: 1 MG/DL (ref 0.76–1.27)
EGFRCR SERPLBLD CKD-EPI 2021: 89 ML/MIN/1.73
EOSINOPHIL # BLD AUTO: 0.1 X10E3/UL (ref 0–0.4)
EOSINOPHIL NFR BLD AUTO: 1 %
ERYTHROCYTE [DISTWIDTH] IN BLOOD BY AUTOMATED COUNT: 13.5 % (ref 11.6–15.4)
EST. AVERAGE GLUCOSE BLD GHB EST-MCNC: 123 MG/DL
GLOBULIN SER CALC-MCNC: 2.2 G/DL (ref 1.5–4.5)
GLUCOSE SERPL-MCNC: 87 MG/DL (ref 70–99)
GLUCOSE UR QL STRIP: NEGATIVE
HBA1C MFR BLD: 5.9 % (ref 4.8–5.6)
HCT VFR BLD AUTO: 44.6 % (ref 37.5–51)
HDLC SERPL-MCNC: 72 MG/DL
HGB BLD-MCNC: 14.5 G/DL (ref 13–17.7)
HGB UR QL STRIP: NEGATIVE
IMM GRANULOCYTES # BLD AUTO: 0 X10E3/UL (ref 0–0.1)
IMM GRANULOCYTES NFR BLD AUTO: 0 %
KETONES UR QL STRIP: NEGATIVE
LDLC SERPL CALC-MCNC: 119 MG/DL (ref 0–99)
LEUKOCYTE ESTERASE UR QL STRIP: NEGATIVE
LYMPHOCYTES # BLD AUTO: 2.3 X10E3/UL (ref 0.7–3.1)
LYMPHOCYTES NFR BLD AUTO: 25 %
MCH RBC QN AUTO: 28.8 PG (ref 26.6–33)
MCHC RBC AUTO-ENTMCNC: 32.5 G/DL (ref 31.5–35.7)
MCV RBC AUTO: 89 FL (ref 79–97)
MONOCYTES # BLD AUTO: 0.5 X10E3/UL (ref 0.1–0.9)
MONOCYTES NFR BLD AUTO: 5 %
NEUTROPHILS # BLD AUTO: 6.3 X10E3/UL (ref 1.4–7)
NEUTROPHILS NFR BLD AUTO: 68 %
NITRITE UR QL STRIP: NEGATIVE
PH UR STRIP: 6 [PH] (ref 5–7.5)
PLATELET # BLD AUTO: 263 X10E3/UL (ref 150–450)
POTASSIUM SERPL-SCNC: 4.4 MMOL/L (ref 3.5–5.2)
PROT SERPL-MCNC: 7.4 G/DL (ref 6–8.5)
PROT UR QL STRIP: NEGATIVE
PSA SERPL-MCNC: 0.6 NG/ML (ref 0–4)
RBC # BLD AUTO: 5.03 X10E6/UL (ref 4.14–5.8)
SODIUM SERPL-SCNC: 142 MMOL/L (ref 134–144)
SP GR UR STRIP: <=1.005 (ref 1–1.03)
TRIGL SERPL-MCNC: 51 MG/DL (ref 0–149)
UROBILINOGEN UR STRIP-MCNC: 0.2 MG/DL (ref 0.2–1)
VLDLC SERPL CALC-MCNC: 9 MG/DL (ref 5–40)
WBC # BLD AUTO: 9.3 X10E3/UL (ref 3.4–10.8)

## 2025-08-01 DIAGNOSIS — M54.12 BRACHIAL NEURITIS: ICD-10-CM

## 2025-08-01 DIAGNOSIS — F41.1 GAD (GENERALIZED ANXIETY DISORDER): ICD-10-CM

## 2025-08-01 RX ORDER — ALPRAZOLAM 0.5 MG
0.5 TABLET ORAL NIGHTLY PRN
Qty: 30 TABLET | Refills: 2 | Status: SHIPPED | OUTPATIENT
Start: 2025-08-01 | End: 2025-10-30

## 2025-08-01 RX ORDER — TIZANIDINE 2 MG/1
2 TABLET ORAL EVERY 6 HOURS PRN
Qty: 30 TABLET | Refills: 0 | Status: SHIPPED | OUTPATIENT
Start: 2025-08-01

## 2025-08-01 NOTE — TELEPHONE ENCOUNTER
PCP: SHAHEEN Tang MD    Last appt: 7/30/2025    Future Appointments   Date Time Provider Department Center   7/31/2026 10:00 AM SHAHEEN Tang MD [facility]       Requested Prescriptions     Pending Prescriptions Disp Refills    tiZANidine (ZANAFLEX) 2 MG tablet 30 tablet 0     Sig: Take 1 tablet by mouth every 6 hours as needed (muscle spasms)    ALPRAZolam (XANAX) 0.5 MG tablet 30 tablet 2     Sig: Take 1 tablet by mouth nightly as needed for Anxiety for up to 90 days. Max Daily Amount: 0.5 mg

## (undated) DEVICE — 3M™ STERI-DRAPE™ INSTRUMENT POUCH 1018: Brand: STERI-DRAPE™

## (undated) DEVICE — MAGNETIC INSTRUMENT PAD 10" X 16"; MEDIUM; DISPOSABLE: Brand: CARDINAL HEALTH

## (undated) DEVICE — POLYLINED TOWEL: Brand: CONVERTORS

## (undated) DEVICE — APPLICATOR MEDICATED 26 CC SOLUTION HI LT ORNG CHLORAPREP

## (undated) DEVICE — TRAY CATH 16F DRN BG LTX -- CONVERT TO ITEM 363158

## (undated) DEVICE — 3M™ TEGADERM™ TRANSPARENT FILM DRESSING FRAME STYLE, 1626W, 4 IN X 4-3/4 IN (10 CM X 12 CM), 50/CT 4CT/CASE: Brand: 3M™ TEGADERM™

## (undated) DEVICE — SLIM BODY SKIN STAPLER: Brand: APPOSE ULC

## (undated) DEVICE — HALTER TRACTION HD W/ TRI COTTON LINING FOAM LTX

## (undated) DEVICE — SET EXTN PRIMING 0.59ML 8.5IN 1.55LB PRSS RATE MINIBOR PUR

## (undated) DEVICE — 1200 GUARD II KIT W/5MM TUBE W/O VAC TUBE: Brand: GUARDIAN

## (undated) DEVICE — Z DISCONTINUED USE 2717541 SUTURE STRATAFIX SZ 3-0 L30CM NONABSORBABLE UD L26MM FS 3/8

## (undated) DEVICE — DRSG PATCH ANTIMIC 1INX4.0MM -- CONVERT TO ITEM 356053

## (undated) DEVICE — BIPOLAR FORCEPS CORD,BANANA LEADS: Brand: VALLEYLAB

## (undated) DEVICE — Z CONVERTED USE 2107985 COVER FLROSCP W36XL28IN 4 SIDE ADH

## (undated) DEVICE — FORCEPS BX L240CM JAW DIA2.8MM L CAP W/ NDL MIC MESH TOOTH

## (undated) DEVICE — SOLUTION IRRIG 3000ML 0.9% SOD CHL FLX CONT 0797208] ICU MEDICAL INC]

## (undated) DEVICE — STERILE POLYISOPRENE POWDER-FREE SURGICAL GLOVES: Brand: PROTEXIS

## (undated) DEVICE — COVER,MAYO STAND,STERILE: Brand: MEDLINE

## (undated) DEVICE — DRAIN SURG 10FR SIL RND HUBLESS W/ 1/8IN TRCR BLAK

## (undated) DEVICE — MEDI-VAC NON-CONDUCTIVE SUCTION TUBING: Brand: CARDINAL HEALTH

## (undated) DEVICE — SUTURE VCRL SZ 2-0 L18IN ABSRB UD CT-1 L36MM 1/2 CIR J839D

## (undated) DEVICE — STERILE POLYISOPRENE POWDER-FREE SURGICAL GLOVES WITH EMOLLIENT COATING: Brand: PROTEXIS

## (undated) DEVICE — DRAPE,LAPAROTOMY,PCH,STERILE: Brand: MEDLINE

## (undated) DEVICE — SYRINGE MED 10ML LUERLOCK TIP W/O SFTY DISP

## (undated) DEVICE — HANDLE LT SNAP ON ULT DURABLE LENS FOR TRUMPF ALC DISPOSABLE

## (undated) DEVICE — CASPAR DISTR PIN12MMSTER: Brand: AESCULAP

## (undated) DEVICE — BIT DRL L14MM DIA2.2MM G FLAT CHK FOR ANT CERV PLT SYS

## (undated) DEVICE — Device: Brand: JELCO

## (undated) DEVICE — SYSTEM SKIN CLSR 22CM DERMBND PRINEO

## (undated) DEVICE — GOWN,SIRUS,NONRNF,SETINSLV,2XL,18/CS: Brand: MEDLINE

## (undated) DEVICE — DRESSING N ADH W3XL4IN NONWOVEN

## (undated) DEVICE — SOLUTION IV 1000ML 0.9% SOD CHL

## (undated) DEVICE — COVER,TABLE,60X90,STERILE: Brand: MEDLINE

## (undated) DEVICE — PREP CHLORAPREP 10.5 ML ORG --

## (undated) DEVICE — LAMINECTOMY RICHMOND-LF: Brand: MEDLINE INDUSTRIES, INC.

## (undated) DEVICE — MARKER,SKIN,WI/RULER AND LABELS: Brand: MEDLINE

## (undated) DEVICE — TOWEL SURG W17XL27IN STD BLU COT NONFENESTRATED PREWASHED

## (undated) DEVICE — HYPODERMIC SAFETY NEEDLE: Brand: MONOJECT

## (undated) DEVICE — GAUZE SPONGES,12 PLY: Brand: CURITY

## (undated) DEVICE — SPONGE: SPECIALTY PEANUT XR 100/CS: Brand: MEDICAL ACTION INDUSTRIES

## (undated) DEVICE — TUBING IRRIG L77IN DIA0.241IN L BOR FOR CYSTO W/ NVENT

## (undated) DEVICE — INSULATED BLADE ELECTRODE: Brand: EDGE

## (undated) DEVICE — SYRINGE MED 20ML STD CLR PLAS LUERLOCK TIP N CTRL DISP

## (undated) DEVICE — INFECTION CONTROL KIT SYS

## (undated) DEVICE — SUT PROL 6-0 18IN RB2 DA BLU --

## (undated) DEVICE — BONE WAX WHITE: Brand: BONE WAX WHITE

## (undated) DEVICE — TOOL 14MH30 LEGEND 14CM 3MM: Brand: MIDAS REX ™

## (undated) DEVICE — SYRINGE MED 5ML STD CLR PLAS LUERLOCK TIP N CTRL DISP

## (undated) DEVICE — KENDALL SCD EXPRESS SLEEVES, KNEE LENGTH, MEDIUM: Brand: KENDALL SCD

## (undated) DEVICE — SUTURE PERMAHAND SZ 2-0 L30IN NONABSORBABLE BLK SILK W/O A305H

## (undated) DEVICE — DRAPE MICSCP W20XL64IN CLR LENS WECK FOR ZEISS OPMI

## (undated) DEVICE — TUBING IRRIG COMPATIBLE W ERBE MEDIVATOR PMP HYDR

## (undated) DEVICE — Device

## (undated) DEVICE — SUTURE VCRL UD BR CT 3-0 18IN CT1 J838D

## (undated) DEVICE — GLOVE ORANGE PI 8   MSG9080

## (undated) DEVICE — FLOSEAL MATRIX IS INDICATED IN SURGICAL PROCEDURES (OTHER THAN IN OPHTHALMIC) AS AN ADJUNCT TO HEMOSTASIS WHEN CONTROL OF BLEEDING BY LIGATURE OR CONVENTIONALPROCEDURES IS INEFFECTIVE OR IMPRACTICAL.: Brand: FLOSEAL HEMOSTATIC MATRIX

## (undated) DEVICE — SOLUTION IRRIG 1000ML H2O STRL BLT